# Patient Record
Sex: FEMALE | Race: ASIAN | NOT HISPANIC OR LATINO
[De-identification: names, ages, dates, MRNs, and addresses within clinical notes are randomized per-mention and may not be internally consistent; named-entity substitution may affect disease eponyms.]

---

## 2017-06-12 ENCOUNTER — APPOINTMENT (OUTPATIENT)
Dept: ENDOCRINOLOGY | Facility: CLINIC | Age: 37
End: 2017-06-12

## 2017-06-12 VITALS
HEIGHT: 63 IN | BODY MASS INDEX: 21.26 KG/M2 | HEART RATE: 78 BPM | OXYGEN SATURATION: 96 % | WEIGHT: 120 LBS | DIASTOLIC BLOOD PRESSURE: 58 MMHG | SYSTOLIC BLOOD PRESSURE: 92 MMHG

## 2018-04-16 ENCOUNTER — APPOINTMENT (OUTPATIENT)
Dept: ENDOCRINOLOGY | Facility: CLINIC | Age: 38
End: 2018-04-16

## 2018-05-04 ENCOUNTER — RESULT REVIEW (OUTPATIENT)
Age: 38
End: 2018-05-04

## 2019-08-19 ENCOUNTER — OTHER (OUTPATIENT)
Age: 39
End: 2019-08-19

## 2019-08-19 ENCOUNTER — APPOINTMENT (OUTPATIENT)
Dept: ENDOCRINOLOGY | Facility: CLINIC | Age: 39
End: 2019-08-19
Payer: COMMERCIAL

## 2019-08-19 VITALS — OXYGEN SATURATION: 96 % | HEART RATE: 55 BPM | DIASTOLIC BLOOD PRESSURE: 60 MMHG | SYSTOLIC BLOOD PRESSURE: 92 MMHG

## 2019-08-19 PROCEDURE — 99214 OFFICE O/P EST MOD 30 MIN: CPT

## 2019-08-19 NOTE — HISTORY OF PRESENT ILLNESS
[FreeTextEntry1] : 38 yo female referred initially for secondary adrenal insufficiency evaluation 8/6/2015. 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, initially complicated by neurocardiogenic shock (EF 10% per cardiology, similar to Takotsubo's cardiomyopathy) requiring balloon pump and pressors, was stabilized at [facility] for 1-2 weeks (had D&C for loss of pregnancy), EF improved, transferred to [place] for neuro care, had embolization x 2 and craniotomy with resection of large AVM and  shunt, was in neuro ICU for a month with trach and peg, transferred to [facility] for rehab for 5 weeks, then had seizure, found that glue they used during neurosurgery for embolization was causing inflammation of brain so was admitted back to [place] in early November for repeat neurosurgery to clean out glue, was back in neuro ICU for another month, then went back to [place] rehab for another 6 weeks, then d/c'd to home from [place] 12/2014. Since her ICH Decadron had been started for cerebral edema. On doses as high as 10 mg q6. Her  had been slowly titrating her steroid doses down. Seen initially by me 8/2015. Recommended slow taper over the course of 1-2 months of prednisone, which she has been off now since 2015. No symptoms or signs of adrenal insufficiency. Repeat am cortisol off steroids appropriate 8/2015. \par \par She was also found to have osteoporosis with DXA 8/3/15 revealing a T-score of -3.5 in the spine, -2.5 in the total hip[, and -2.3 in the femoral neck. She has not had any known fractures. She is mostly immobile, but trying to do some exercises with legs and some attempts at walking.  admits to her being a high fall risk. He also reports she has had amenorrhea. Recommended Alendronate weekly which she taking in the liquid form started 3/2016. She has had some bloating and gas but denies any gastritis or reflux symptoms. No interval falls or fractures. Had took extracted 1-2 months prior to starting Fosamax. Has been more mobile since last visit now walking with a walker at home. No recent falls or fractures.

## 2019-08-19 NOTE — RESULTS/DATA
[Unknown] : unknown [L1 - L4] : L1 - L4 [T-Score ___] : T-score: [unfilled] [FreeTextEntry2] : 8/3/15 [FreeTextEntry1] : 6/12/17: Spine -2.9, Hip -3.5, Neck -3.6, Radius 1/3 0.4

## 2019-08-19 NOTE — REVIEW OF SYSTEMS
[Fatigue] : fatigue [Gas/Bloating] : gas/bloating [Cold Intolerance] : cold intolerant [Difficulty Walking] : difficulty walking [All other systems negative] : All other systems negative [Recent Weight Loss (___ Lbs)] : recent [unfilled] ~Ulb weight loss [Chest Pain] : no chest pain [Shortness Of Breath] : no shortness of breath [Nausea] : no nausea [Vomiting] : no vomiting was observed [FreeTextEntry8] : amenorrhea

## 2019-08-19 NOTE — PHYSICAL EXAM
[Alert] : alert [No Acute Distress] : no acute distress [Normal Sclera/Conjunctiva] : normal sclera/conjunctiva [No Proptosis] : no proptosis [Normal Oropharynx] : the oropharynx was normal [Supple] : the neck was supple [Thyroid Not Enlarged] : the thyroid was not enlarged [No Respiratory Distress] : no respiratory distress [Normal Rate and Effort] : normal respiratory rhythm and effort [No Accessory Muscle Use] : no accessory muscle use [Normal Rate] : heart rate was normal  [Clear to Auscultation] : lungs were clear to auscultation bilaterally [Normal S1, S2] : normal S1 and S2 [Regular Rhythm] : with a regular rhythm [Normal Bowel Sounds] : normal bowel sounds [No Edema] : there was no peripheral edema [Not Tender] : non-tender [Soft] : abdomen soft [No Stigmata of Cushings Syndrome] : no stigmata of cushings syndrome [Not Distended] : not distended [Abdominal Striae] : no abdominal striae [Acanthosis Nigricans] : no acanthosis nigricans [Normal Affect] : the affect was normal [Normal Mood] : the mood was normal [de-identified] : +nystagmus [de-identified] : unable to assess gait

## 2019-08-19 NOTE — ASSESSMENT
[FreeTextEntry1] : 38 y/o F w/\par \par 1. Osteoporosis- Discussed treatment options at length with the . Pt. at high risk of fracture and warrants treatment. Continue with Fosamax. DXA performed 6/2017 with improvement in spine, but worse in the hip and femoral neck. Prior images unable to be viewed so difficulty fully assessing if there is a significant difference. Most likely cause is steroids possibly combined with amenorrhea. Estrogen may theoretically be a reasonable option for treatment especially if her estradiol is low, but given her immobility, I would avoid estrogen for risk of DVT. Can c/w Fosamax for now repeat DXA. If worsening DXA or fracture switch to Prolia. \par \par 2. Amenorrhea- recommend Provera and GYN follow-up. Consider OCP now more mobile. Discussed risk of endometrial cancer with amenorrhea.

## 2019-08-23 ENCOUNTER — APPOINTMENT (OUTPATIENT)
Dept: ENDOCRINOLOGY | Facility: CLINIC | Age: 39
End: 2019-08-23
Payer: COMMERCIAL

## 2019-08-23 VITALS — WEIGHT: 118 LBS | BODY MASS INDEX: 20.91 KG/M2 | HEIGHT: 63 IN

## 2019-08-23 PROCEDURE — 77085 DXA BONE DENSITY AXL VRT FX: CPT

## 2020-02-21 ENCOUNTER — APPOINTMENT (OUTPATIENT)
Dept: INTERNAL MEDICINE | Facility: CLINIC | Age: 40
End: 2020-02-21
Payer: COMMERCIAL

## 2020-02-21 VITALS
SYSTOLIC BLOOD PRESSURE: 86 MMHG | HEART RATE: 60 BPM | DIASTOLIC BLOOD PRESSURE: 62 MMHG | TEMPERATURE: 98.8 F | OXYGEN SATURATION: 97 % | HEIGHT: 63 IN

## 2020-02-21 DIAGNOSIS — H55.00 UNSPECIFIED NYSTAGMUS: ICD-10-CM

## 2020-02-21 DIAGNOSIS — G90.9 DISORDER OF THE AUTONOMIC NERVOUS SYSTEM, UNSPECIFIED: ICD-10-CM

## 2020-02-21 DIAGNOSIS — I31.3 PERICARDIAL EFFUSION (NONINFLAMMATORY): ICD-10-CM

## 2020-02-21 DIAGNOSIS — I95.9 HYPOTENSION, UNSPECIFIED: ICD-10-CM

## 2020-02-21 DIAGNOSIS — R56.9 UNSPECIFIED CONVULSIONS: ICD-10-CM

## 2020-02-21 DIAGNOSIS — I95.1 ORTHOSTATIC HYPOTENSION: ICD-10-CM

## 2020-02-21 DIAGNOSIS — R41.3 OTHER AMNESIA: ICD-10-CM

## 2020-02-21 LAB
BILIRUB UR QL STRIP: NEGATIVE
CLARITY UR: CLEAR
COLLECTION METHOD: NORMAL
GLUCOSE UR-MCNC: NEGATIVE
HCG UR QL: 0.2 EU/DL
HGB UR QL STRIP.AUTO: NEGATIVE
KETONES UR-MCNC: NEGATIVE
LEUKOCYTE ESTERASE UR QL STRIP: NEGATIVE
NITRITE UR QL STRIP: NEGATIVE
PH UR STRIP: 7.5
PROT UR STRIP-MCNC: NEGATIVE
SP GR UR STRIP: 1.02

## 2020-02-21 PROCEDURE — 99205 OFFICE O/P NEW HI 60 MIN: CPT | Mod: 25

## 2020-02-21 PROCEDURE — 81003 URINALYSIS AUTO W/O SCOPE: CPT | Mod: QW

## 2020-02-21 RX ORDER — CHROMIUM 200 MCG
500 TABLET ORAL DAILY
Refills: 0 | Status: ACTIVE | COMMUNITY
Start: 2020-02-21

## 2020-02-21 RX ORDER — MEDROXYPROGESTERONE ACETATE 10 MG/1
10 TABLET ORAL
Qty: 1 | Refills: 5 | Status: DISCONTINUED | COMMUNITY
Start: 2017-06-12 | End: 2020-02-21

## 2020-02-24 NOTE — RESULTS/DATA
[] : results reviewed [de-identified] : normal [de-identified] : normal [de-identified] : r bbb

## 2020-02-24 NOTE — ASSESSMENT
[Patient Optimized for Surgery] : Patient optimized for surgery [No Further Testing Recommended] : no further testing recommended [As per surgery] : as per surgery [FreeTextEntry4] : Pt with complicated history, although All acute illness have resolved. patient is verbally limited, has residual left hemiparesis, and hemianopsia.

## 2020-02-24 NOTE — REVIEW OF SYSTEMS
[Fatigue] : fatigue [Constipation] : constipation [Heartburn] : heartburn [Headache] : headache [Dizziness] : dizziness [Memory Loss] : memory loss [Unsteady Walking] : ataxia [Fever] : no fever [Chills] : no chills [Night Sweats] : no night sweats [Chest Pain] : no chest pain [Palpitations] : no palpitations [Wheezing] : no wheezing [Shortness Of Breath] : no shortness of breath [Abdominal Pain] : no abdominal pain [Cough] : no cough [Nausea] : no nausea [Vomiting] : no vomiting [Incontinence] : no incontinence [Negative] : Heme/Lymph [FreeTextEntry3] : wears glasses   [FreeTextEntry7] : takes meds for constipation- will have BM daily [de-identified] : uses AD- therapeutic ambulation. symptoms chronic

## 2020-02-24 NOTE — HISTORY OF PRESENT ILLNESS
[Spouse] : spouse [(Patient denies any chest pain, claudication, dyspnea on exertion, orthopnea, palpitations or syncope)] : Patient denies any chest pain, claudication, dyspnea on exertion, orthopnea, palpitations or syncope [Aortic Stenosis] : no aortic stenosis [Atrial Fibrillation] : no atrial fibrillation [Coronary Artery Disease] : no coronary artery disease [Recent Myocardial Infarction] : no recent myocardial infarction [Implantable Device/Pacemaker] : no implantable device/pacemaker [COPD] : no COPD [Asthma] : no asthma [Sleep Apnea] : no sleep apnea [Smoker] : not a smoker [No Adverse Anesthesia Reaction] : no adverse anesthesia reaction in self or family member [Chronic Kidney Disease] : no chronic kidney disease [Chronic Anticoagulation] : no chronic anticoagulation [Diabetes] : no diabetes [FreeTextEntry1] : L 5th metatarsal fx repair  fx [FreeTextEntry2] : 2/26/2020 [FreeTextEntry3] : Dr. Barrett Lynch [FreeTextEntry4] : Ms. Pat Gregory presents with her  for medical clearance.  is the primary caretaker of this disabled woman, and he is an otolaryngologist. He is an excellent historian. Patient has limited communication.\par Ms. gregory has a complicated medical history whereas in August 2014 she suffered a ruptured right posterior parietal AVM,  causing intraventricular hemorrhage resulting in left hemiplegia, hemianopsia, this dysautonomia, Takotsubo cardiomyopathy requiring IABP and a fetal demise at 3 months\par Patient was subsequently transferred to [place], developed a pericardial effusion, had a pericardiocentesis and was transferred to [place] rehab by September. 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 [FreeTextEntry7] : echocardiogram 2015: EF 50% trace effusions, trace MR\par EKG: RBBB

## 2020-02-24 NOTE — PHYSICAL EXAM
[Alert and Oriented x3] : oriented to person, place, and time [Normal Sclera/Conjunctiva] : normal sclera/conjunctiva [Normal Outer Ear/Nose] : the outer ears and nose were normal in appearance [Normal Oropharynx] : the oropharynx was normal [Normal Nasal Mucosa] : the nasal mucosa was normal [Normal TMs] : both tympanic membranes were normal [No JVD] : no jugular venous distention [No Lymphadenopathy] : no lymphadenopathy [Thyroid Normal, No Nodules] : the thyroid was normal and there were no nodules present [Supple] : supple [Clear to Auscultation] : lungs were clear to auscultation bilaterally [No Respiratory Distress] : no respiratory distress  [No Accessory Muscle Use] : no accessory muscle use [Normal S1, S2] : normal S1 and S2 [Regular Rhythm] : with a regular rhythm [Normal Rate] : normal rate  [No Abdominal Bruit] : a ~M bruit was not heard ~T in the abdomen [No Murmur] : no murmur heard [Soft] : abdomen soft [No Edema] : there was no peripheral edema [Non Tender] : non-tender [Non-distended] : non-distended [No Masses] : no abdominal mass palpated [No HSM] : no HSM [Normal Bowel Sounds] : normal bowel sounds [Normal] : no rash [Normal Insight/Judgement] : insight and judgment were intact [Normal Affect] : the affect was normal [de-identified] : thin, [de-identified] : sluggish pupil reaction b/l, Nystagmus b/l, disconjugate gaze [de-identified] : clonus t/o    high tone to left LE, limited strength t/o BUEs and BLEs [de-identified] : sensation decreased to b/l LEs / AD when ambulating  [de-identified] : disorientation occ to date and time

## 2021-07-19 ENCOUNTER — APPOINTMENT (OUTPATIENT)
Dept: ENDOCRINOLOGY | Facility: CLINIC | Age: 41
End: 2021-07-19
Payer: COMMERCIAL

## 2021-07-19 VITALS
WEIGHT: 120 LBS | TEMPERATURE: 98.4 F | OXYGEN SATURATION: 96 % | HEIGHT: 63 IN | DIASTOLIC BLOOD PRESSURE: 56 MMHG | BODY MASS INDEX: 21.26 KG/M2 | HEART RATE: 62 BPM | SYSTOLIC BLOOD PRESSURE: 86 MMHG

## 2021-07-19 PROCEDURE — 99072 ADDL SUPL MATRL&STAF TM PHE: CPT

## 2021-07-19 PROCEDURE — 99214 OFFICE O/P EST MOD 30 MIN: CPT

## 2021-07-19 RX ORDER — ALENDRONATE SODIUM 70 MG/1
70 TABLET ORAL
Qty: 4 | Refills: 0 | Status: DISCONTINUED | COMMUNITY
Start: 2021-05-14

## 2021-07-19 NOTE — PHYSICAL EXAM
[Alert] : alert [No Acute Distress] : no acute distress [No Respiratory Distress] : no respiratory distress [No Accessory Muscle Use] : no accessory muscle use [Normal Rate and Effort] : normal respiratory rate and effort [Clear to Auscultation] : lungs were clear to auscultation bilaterally [Normal S1, S2] : normal S1 and S2 [Normal Rate] : heart rate was normal [Regular Rhythm] : with a regular rhythm [No Edema] : no peripheral edema [Normal Bowel Sounds] : normal bowel sounds [Not Tender] : non-tender [Not Distended] : not distended [Soft] : abdomen soft [Normal Affect] : the affect was normal [Normal Mood] : the mood was normal [de-identified] : +healed trach site

## 2021-07-19 NOTE — DATA REVIEWED
[FreeTextEntry1] : Labs 7/2019:\par PTH 45\par TSH 0.71\par CMP normal\par \par \par Labs 5/2017:\par Ca 9.5\par Glucose 89\par Creatinine 0.68\par Sodium 144\par \par Labs 3/2016:\par PTH 39\par CMP normal\par CBC normal\par \par Labs 8/2015:\par CMP normal\par TSH 2.54\par Free T4 1.2\par LH 7.6\par FSH 8.7\par Prolactin 7.7\par Estradiol 37\par Cortisol 21\par IGF-1 210\par \par Labs 5/2015:\par LDL 93\par A1c 4.4%\par Vit D 43\par TSH 0.93\par CMP normal except AST of 58

## 2021-07-19 NOTE — REVIEW OF SYSTEMS
[Fatigue] : no fatigue [Recent Weight Gain (___ Lbs)] : no recent weight gain [Recent Weight Loss (___ Lbs)] : no recent weight loss [Dysphagia] : no dysphagia [Dysphonia] : no dysphonia [Chest Pain] : no chest pain [Palpitations] : no palpitations [Nausea] : no nausea [Vomiting] : no vomiting [All other systems negative] : All other systems negative

## 2021-07-19 NOTE — HISTORY OF PRESENT ILLNESS
[FreeTextEntry1] : 42 yo female referred initially for secondary adrenal insufficiency evaluation 8/6/2015. 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, initially complicated by neurocardiogenic shock (EF 10% per cardiology, similar to Takotsubo's cardiomyopathy) requiring balloon pump and pressors, was stabilized at [facility] for 1-2 weeks (had D&C for loss of pregnancy), EF improved, transferred to [place] for neuro care, had embolization x 2 and craniotomy with resection of large AVM and  shunt, was in neuro ICU for a month with trach and peg, transferred to [facility] for rehab for 5 weeks, then had seizure, found that glue they used during neurosurgery for embolization was causing inflammation of brain so was admitted back to [place] in early November for repeat neurosurgery to clean out glue, was back in neuro ICU for another month, then went back to [place] rehab for another 6 weeks, then d/c'd to home from [place] 12/2014. Since her ICH Decadron had been started for cerebral edema. On doses as high as 10 mg q6. Her  had been slowly titrating her steroid doses down. Seen initially by me 8/2015. Recommended slow taper over the course of 1-2 months of prednisone, which she has been off now since 2015. No symptoms or signs of adrenal insufficiency. Repeat am cortisol off steroids appropriate 8/2015. \par \par She was also found to have osteoporosis with DXA 8/3/15 revealing a T-score of -3.5 in the spine, -2.5 in the total hip[, and -2.3 in the femoral neck. She has not had any known fractures. She was mostly immobile, but trying to do some exercises with legs and some attempts at walking.  admits to her being a high fall risk. He also reports she has had amenorrhea. Recommended Alendronate weekly which she started taking in the liquid form started 3/2016. She has had some bloating and gas but denies any gastritis or reflux symptoms. No interval falls or fractures. Had tooth extracted 1-2 months prior to starting Fosamax. Has been more mobile since last visit now walking with a walker at home. No recent falls or fractures.

## 2021-07-19 NOTE — ASSESSMENT
[FreeTextEntry1] : 40 y/o F w/\par \par 1. Osteoporosis- Discussed treatment options at length with the . Pt. at high risk of fracture and warrants treatment. Continue with Fosamax. DXA performed 6/2017 with improvement in spine, but worse in the hip and femoral neck. Prior images unable to be viewed so difficulty fully assessing if there is a significant difference. Most likely cause is steroids possibly combined with amenorrhea. Estrogen may theoretically be a reasonable option for treatment especially if her estradiol is low. Discussed at length with her  who is a physician. 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 We discussed the benefits of a low dose estrogen/progesterone OCP to help maintain any improvement in bone disease we made with the past 5 years of Fosamax. Repeat DXA now. If worsening DXA or fracture switch to Prolia. Otherwise would recommend drug holiday with estrogen replacement. Will have her  discuss OCP use with her GYN and neurosurgeon as well. \par \par 2. Amenorrhea- recommend OCP. \par \par

## 2021-08-13 ENCOUNTER — APPOINTMENT (OUTPATIENT)
Dept: ENDOCRINOLOGY | Facility: CLINIC | Age: 41
End: 2021-08-13
Payer: COMMERCIAL

## 2021-08-13 VITALS — TEMPERATURE: 97.6 F | BODY MASS INDEX: 20.38 KG/M2 | HEIGHT: 63 IN | WEIGHT: 115 LBS

## 2021-08-13 PROCEDURE — 77080 DXA BONE DENSITY AXIAL: CPT

## 2021-09-17 ENCOUNTER — APPOINTMENT (OUTPATIENT)
Dept: OBGYN | Facility: CLINIC | Age: 41
End: 2021-09-17
Payer: COMMERCIAL

## 2021-09-17 VITALS
HEIGHT: 63 IN | SYSTOLIC BLOOD PRESSURE: 90 MMHG | WEIGHT: 115 LBS | DIASTOLIC BLOOD PRESSURE: 56 MMHG | BODY MASS INDEX: 20.38 KG/M2

## 2021-09-17 PROCEDURE — 99396 PREV VISIT EST AGE 40-64: CPT

## 2021-09-17 PROCEDURE — 82270 OCCULT BLOOD FECES: CPT

## 2021-09-19 LAB — HPV HIGH+LOW RISK DNA PNL CVX: NOT DETECTED

## 2021-09-22 LAB — CYTOLOGY CVX/VAG DOC THIN PREP: NORMAL

## 2021-10-04 ENCOUNTER — RX RENEWAL (OUTPATIENT)
Age: 41
End: 2021-10-04

## 2021-10-09 ENCOUNTER — APPOINTMENT (OUTPATIENT)
Dept: MAMMOGRAPHY | Facility: CLINIC | Age: 41
End: 2021-10-09
Payer: COMMERCIAL

## 2021-10-09 ENCOUNTER — APPOINTMENT (OUTPATIENT)
Dept: ULTRASOUND IMAGING | Facility: CLINIC | Age: 41
End: 2021-10-09
Payer: COMMERCIAL

## 2021-10-09 ENCOUNTER — RESULT REVIEW (OUTPATIENT)
Age: 41
End: 2021-10-09

## 2021-10-09 ENCOUNTER — OUTPATIENT (OUTPATIENT)
Dept: OUTPATIENT SERVICES | Facility: HOSPITAL | Age: 41
LOS: 1 days | End: 2021-10-09
Payer: COMMERCIAL

## 2021-10-09 DIAGNOSIS — M81.0 AGE-RELATED OSTEOPOROSIS WITHOUT CURRENT PATHOLOGICAL FRACTURE: ICD-10-CM

## 2021-10-09 PROCEDURE — 77063 BREAST TOMOSYNTHESIS BI: CPT | Mod: 26

## 2021-10-09 PROCEDURE — 77067 SCR MAMMO BI INCL CAD: CPT

## 2021-10-09 PROCEDURE — 76641 ULTRASOUND BREAST COMPLETE: CPT | Mod: 26,50

## 2021-10-09 PROCEDURE — 76641 ULTRASOUND BREAST COMPLETE: CPT

## 2021-10-09 PROCEDURE — 77063 BREAST TOMOSYNTHESIS BI: CPT

## 2021-10-09 PROCEDURE — 77067 SCR MAMMO BI INCL CAD: CPT | Mod: 26

## 2021-10-10 ENCOUNTER — RESULT REVIEW (OUTPATIENT)
Age: 41
End: 2021-10-10

## 2021-10-10 DIAGNOSIS — R92.1 MAMMOGRAPHIC CALCIFICATION FOUND ON DIAGNOSTIC IMAGING OF BREAST: ICD-10-CM

## 2021-10-22 ENCOUNTER — RESULT REVIEW (OUTPATIENT)
Age: 41
End: 2021-10-22

## 2021-10-22 ENCOUNTER — APPOINTMENT (OUTPATIENT)
Dept: MAMMOGRAPHY | Facility: CLINIC | Age: 41
End: 2021-10-22
Payer: COMMERCIAL

## 2021-10-22 ENCOUNTER — OUTPATIENT (OUTPATIENT)
Dept: OUTPATIENT SERVICES | Facility: HOSPITAL | Age: 41
LOS: 1 days | End: 2021-10-22
Payer: COMMERCIAL

## 2021-10-22 DIAGNOSIS — Z00.8 ENCOUNTER FOR OTHER GENERAL EXAMINATION: ICD-10-CM

## 2021-10-22 PROCEDURE — 77065 DX MAMMO INCL CAD UNI: CPT

## 2021-10-22 PROCEDURE — 77065 DX MAMMO INCL CAD UNI: CPT | Mod: 26,LT

## 2021-11-05 ENCOUNTER — ASOB RESULT (OUTPATIENT)
Age: 41
End: 2021-11-05

## 2021-11-05 ENCOUNTER — APPOINTMENT (OUTPATIENT)
Dept: OBGYN | Facility: CLINIC | Age: 41
End: 2021-11-05
Payer: COMMERCIAL

## 2021-11-05 PROCEDURE — 76830 TRANSVAGINAL US NON-OB: CPT

## 2021-11-09 ENCOUNTER — NON-APPOINTMENT (OUTPATIENT)
Age: 41
End: 2021-11-09

## 2022-01-07 ENCOUNTER — RX RENEWAL (OUTPATIENT)
Age: 42
End: 2022-01-07

## 2022-01-07 ENCOUNTER — APPOINTMENT (OUTPATIENT)
Dept: OBGYN | Facility: CLINIC | Age: 42
End: 2022-01-07

## 2022-02-18 ENCOUNTER — ASOB RESULT (OUTPATIENT)
Age: 42
End: 2022-02-18

## 2022-02-18 ENCOUNTER — APPOINTMENT (OUTPATIENT)
Dept: OBGYN | Facility: CLINIC | Age: 42
End: 2022-02-18
Payer: COMMERCIAL

## 2022-02-18 PROCEDURE — 76830 TRANSVAGINAL US NON-OB: CPT

## 2022-02-22 ENCOUNTER — NON-APPOINTMENT (OUTPATIENT)
Age: 42
End: 2022-02-22

## 2022-05-10 ENCOUNTER — RX RENEWAL (OUTPATIENT)
Age: 42
End: 2022-05-10

## 2022-07-15 ENCOUNTER — APPOINTMENT (OUTPATIENT)
Dept: ENDOCRINOLOGY | Facility: CLINIC | Age: 42
End: 2022-07-15

## 2022-07-15 VITALS — BODY MASS INDEX: 21.26 KG/M2 | HEIGHT: 63 IN | WEIGHT: 120 LBS | TEMPERATURE: 98.2 F

## 2022-07-15 PROCEDURE — 77085 DXA BONE DENSITY AXL VRT FX: CPT

## 2022-08-01 ENCOUNTER — APPOINTMENT (OUTPATIENT)
Dept: ENDOCRINOLOGY | Facility: CLINIC | Age: 42
End: 2022-08-01

## 2022-08-02 ENCOUNTER — APPOINTMENT (OUTPATIENT)
Dept: OBGYN | Facility: CLINIC | Age: 42
End: 2022-08-02

## 2022-08-02 ENCOUNTER — ASOB RESULT (OUTPATIENT)
Age: 42
End: 2022-08-02

## 2022-08-02 VITALS
WEIGHT: 120 LBS | HEIGHT: 63 IN | BODY MASS INDEX: 21.26 KG/M2 | DIASTOLIC BLOOD PRESSURE: 66 MMHG | SYSTOLIC BLOOD PRESSURE: 96 MMHG

## 2022-08-02 DIAGNOSIS — R18.8 OTHER ASCITES: ICD-10-CM

## 2022-08-02 PROCEDURE — 82270 OCCULT BLOOD FECES: CPT

## 2022-08-02 PROCEDURE — 99396 PREV VISIT EST AGE 40-64: CPT

## 2022-08-02 PROCEDURE — 76830 TRANSVAGINAL US NON-OB: CPT

## 2022-08-03 ENCOUNTER — APPOINTMENT (OUTPATIENT)
Dept: ENDOCRINOLOGY | Facility: CLINIC | Age: 42
End: 2022-08-03

## 2022-08-03 VITALS
HEART RATE: 57 BPM | HEIGHT: 63 IN | OXYGEN SATURATION: 96 % | DIASTOLIC BLOOD PRESSURE: 60 MMHG | TEMPERATURE: 98.3 F | SYSTOLIC BLOOD PRESSURE: 87 MMHG

## 2022-08-03 LAB
BASOPHILS # BLD AUTO: 0.04 K/UL
BASOPHILS NFR BLD AUTO: 0.5 %
EOSINOPHIL # BLD AUTO: 0.02 K/UL
EOSINOPHIL NFR BLD AUTO: 0.2 %
HCT VFR BLD CALC: 46.8 %
HGB BLD-MCNC: 15.2 G/DL
HPV HIGH+LOW RISK DNA PNL CVX: NOT DETECTED
IMM GRANULOCYTES NFR BLD AUTO: 0.4 %
LYMPHOCYTES # BLD AUTO: 0.96 K/UL
LYMPHOCYTES NFR BLD AUTO: 12 %
MAN DIFF?: NORMAL
MCHC RBC-ENTMCNC: 31 PG
MCHC RBC-ENTMCNC: 32.5 GM/DL
MCV RBC AUTO: 95.5 FL
MONOCYTES # BLD AUTO: 0.35 K/UL
MONOCYTES NFR BLD AUTO: 4.4 %
NEUTROPHILS # BLD AUTO: 6.62 K/UL
NEUTROPHILS NFR BLD AUTO: 82.5 %
PLATELET # BLD AUTO: 239 K/UL
RBC # BLD: 4.9 M/UL
RBC # FLD: 12 %
WBC # FLD AUTO: 8.02 K/UL

## 2022-08-03 PROCEDURE — 99214 OFFICE O/P EST MOD 30 MIN: CPT

## 2022-08-03 NOTE — PHYSICAL EXAM
[Alert] : alert [No Acute Distress] : no acute distress [No Respiratory Distress] : no respiratory distress [No Accessory Muscle Use] : no accessory muscle use [Normal Rate and Effort] : normal respiratory rate and effort [Clear to Auscultation] : lungs were clear to auscultation bilaterally [Normal S1, S2] : normal S1 and S2 [Normal Rate] : heart rate was normal [Regular Rhythm] : with a regular rhythm [No Edema] : no peripheral edema [Normal Bowel Sounds] : normal bowel sounds [Not Tender] : non-tender [Not Distended] : not distended [Soft] : abdomen soft [Normal Affect] : the affect was normal [Normal Mood] : the mood was normal [de-identified] : +healed trach site [de-identified] : b/l weakness in wheelchair

## 2022-08-03 NOTE — REVIEW OF SYSTEMS
[All other systems negative] : All other systems negative [Fatigue] : no fatigue [Recent Weight Gain (___ Lbs)] : no recent weight gain [Recent Weight Loss (___ Lbs)] : no recent weight loss [Dysphagia] : no dysphagia [Dysphonia] : no dysphonia [Chest Pain] : no chest pain [Palpitations] : no palpitations [Nausea] : no nausea [Vomiting] : no vomiting

## 2022-08-03 NOTE — HISTORY OF PRESENT ILLNESS
[FreeTextEntry1] : 41 yo female referred initially for secondary adrenal insufficiency evaluation 8/6/2015. 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, initially complicated by neurocardiogenic shock (EF 10% per cardiology, similar to Takotsubo's cardiomyopathy) requiring balloon pump and pressors, was stabilized at [facility] for 1-2 weeks (had D&C for loss of pregnancy), EF improved, transferred to [place] for neuro care, had embolization x 2 and craniotomy with resection of large AVM and  shunt, was in neuro ICU for a month with trach and peg, transferred to [facility] for rehab for 5 weeks, then had seizure, found that glue they used during neurosurgery for embolization was causing inflammation of brain so was admitted back to [place] in early November for repeat neurosurgery to clean out glue, was back in neuro ICU for another month, then went back to [place] rehab for another 6 weeks, then d/c'd to home from [place] 12/2014. Since her ICH Decadron had been started for cerebral edema. On doses as high as 10 mg q6. Her  had been slowly titrating her steroid doses down. Seen initially by me 8/2015. Recommended slow taper over the course of 1-2 months of prednisone, which she has been off now since 2015. No symptoms or signs of adrenal insufficiency. Repeat am cortisol off steroids appropriate 8/2015. \par \par She was also found to have osteoporosis with DXA 8/3/15 revealing a T-score of -3.5 in the spine, -2.5 in the total hip[, and -2.3 in the femoral neck. She has not had any known fractures. She was mostly immobile, but trying to do some exercises with legs and some attempts at walking.  admits to her being a high fall risk. He also reports she has had amenorrhea. Recommended Alendronate weekly which she started taking in the liquid form started 3/2016. She had some bloating and gas but denies any gastritis or reflux symptoms. Discussed at length with her  who is a physician. 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 We discussed the benefits of a low dose estrogen/progesterone OCP to help maintain any improvement in bone disease we made with the past 5 years of Fosamax. Repeat DEXA  7/2022 (1 year after starting OCP) showed stability in the spine and improvement in the hip. Had tooth extracted 1-2 months prior to starting Fosamax. Has been more mobile since last visit now walking with a walker at home. No recent falls or fractures.

## 2022-08-03 NOTE — ASSESSMENT
[FreeTextEntry1] : 41 y/o F w/\par \par 1. Osteoporosis- Discussed treatment options at length with the . Pt. at high risk of fracture and warrants treatment. s/p Fosamax. DXA performed 6/2017 with improvement in spine, but worse in the hip and femoral neck. Prior images unable to be viewed so difficulty fully assessing if there is a significant difference. Most likely cause is steroids possibly combined with amenorrhea. Estrogen may theoretically be a reasonable option for treatment especially since her estradiol was low. Discussed at length with her  who is a physician. 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 We discussed the benefits of a low dose estrogen/progesterone OCP to help maintain any improvement in bone disease we made with 5 years of Fosamax. Repeat DXA now stable in the spine and improved in the hip on 1 year of OCP. OCP being managed by GYN Dr. Janna Ponce. If worsening DXA or fracture switch to Anabolic agent like Forteo or Tymlos. \par \par 2. Amenorrhea- recommended OCP. Now with some breakthrough bleeding at least once every 3 months. GYN follow-up. \par \par Prep time with review of labs and interval progress notes and consultations \par Discussion with patient regarding osteoporosis and low estrogen state with management plan, risks and benefits of treatment options and goals of care answering all patients questions and addressing all concerns \par Review of recent DEXA results with patient and  at length\par Post-visit completion charting and review\par Total Time 30 min\par \par Specifically causes, evaluation, treatment options, risks, complications, and benefits of available therapies were discussed. Questions were answered.\par \par The submitted E/M billing level for this visit reflects the total time spent on the day of the visit including face-to-face time spent with the patient, non-face-to-face review of medical records and relevant information, documentation, and asynchronous communication with the patient after a visit via phone, email, or patient’s EHR portal after the visit. \par The medical records reviewed are either scanned into the chart or reviewed with the patient using a patient’s electronic medical records portal for patients with records not available to [facility] via electronic transmission platforms from other institutions and labs. \par Time spend counseling and performing coordination of care was also included in determining the appropriate EM billing level.\par \par I have reviewed and verified information regarding the chief complaint and history recorded by the ancillary staff and/or the patient. I have independently reviewed and interpreted tests performed by other physicians and facilities as necessary. \par \par I have discussed with the patient differential diagnosis, reason for auxiliary tests if ordered, risks, benefits, alternatives, and complications of each form of therapy were discussed. \par \par \par \par

## 2022-08-04 ENCOUNTER — TRANSCRIPTION ENCOUNTER (OUTPATIENT)
Age: 42
End: 2022-08-04

## 2022-08-04 LAB
24R-OH-CALCIDIOL SERPL-MCNC: 95.6 PG/ML
25(OH)D3 SERPL-MCNC: 52.6 NG/ML
ALBUMIN SERPL ELPH-MCNC: 4.4 G/DL
ALP BLD-CCNC: 72 U/L
ALT SERPL-CCNC: 12 U/L
ANION GAP SERPL CALC-SCNC: 11 MMOL/L
AST SERPL-CCNC: 12 U/L
BILIRUB SERPL-MCNC: 0.4 MG/DL
BUN SERPL-MCNC: 14 MG/DL
CALCIUM SERPL-MCNC: 9.3 MG/DL
CALCIUM SERPL-MCNC: 9.3 MG/DL
CHLORIDE SERPL-SCNC: 102 MMOL/L
CHOLEST SERPL-MCNC: 190 MG/DL
CO2 SERPL-SCNC: 26 MMOL/L
CREAT SERPL-MCNC: 0.87 MG/DL
EGFR: 85 ML/MIN/1.73M2
ESTIMATED AVERAGE GLUCOSE: 103 MG/DL
GLUCOSE SERPL-MCNC: 78 MG/DL
HBA1C MFR BLD HPLC: 5.2 %
HDLC SERPL-MCNC: 56 MG/DL
LDLC SERPL CALC-MCNC: 102 MG/DL
NONHDLC SERPL-MCNC: 135 MG/DL
PARATHYROID HORMONE INTACT: 29 PG/ML
PHOSPHATE SERPL-MCNC: 3.1 MG/DL
POTASSIUM SERPL-SCNC: 5 MMOL/L
PROT SERPL-MCNC: 6.4 G/DL
SODIUM SERPL-SCNC: 139 MMOL/L
T4 FREE SERPL-MCNC: 1.4 NG/DL
TRIGL SERPL-MCNC: 165 MG/DL
TSH SERPL-ACNC: 0.9 UIU/ML

## 2022-08-06 LAB — CYTOLOGY CVX/VAG DOC THIN PREP: NORMAL

## 2022-11-11 ENCOUNTER — RESULT REVIEW (OUTPATIENT)
Age: 42
End: 2022-11-11

## 2022-11-11 ENCOUNTER — APPOINTMENT (OUTPATIENT)
Dept: MAMMOGRAPHY | Facility: CLINIC | Age: 42
End: 2022-11-11

## 2022-11-11 ENCOUNTER — APPOINTMENT (OUTPATIENT)
Dept: ULTRASOUND IMAGING | Facility: CLINIC | Age: 42
End: 2022-11-11

## 2022-11-11 ENCOUNTER — OUTPATIENT (OUTPATIENT)
Dept: OUTPATIENT SERVICES | Facility: HOSPITAL | Age: 42
LOS: 1 days | End: 2022-11-11
Payer: COMMERCIAL

## 2022-11-11 DIAGNOSIS — Z00.8 ENCOUNTER FOR OTHER GENERAL EXAMINATION: ICD-10-CM

## 2022-11-11 DIAGNOSIS — Z01.419 ENCOUNTER FOR GYNECOLOGICAL EXAMINATION (GENERAL) (ROUTINE) WITHOUT ABNORMAL FINDINGS: ICD-10-CM

## 2022-11-11 PROCEDURE — 77063 BREAST TOMOSYNTHESIS BI: CPT

## 2022-11-11 PROCEDURE — 76641 ULTRASOUND BREAST COMPLETE: CPT | Mod: 26,50

## 2022-11-11 PROCEDURE — 77067 SCR MAMMO BI INCL CAD: CPT | Mod: 26

## 2022-11-11 PROCEDURE — 77063 BREAST TOMOSYNTHESIS BI: CPT | Mod: 26

## 2022-11-11 PROCEDURE — 76641 ULTRASOUND BREAST COMPLETE: CPT

## 2022-11-11 PROCEDURE — 77067 SCR MAMMO BI INCL CAD: CPT

## 2023-06-30 ENCOUNTER — APPOINTMENT (OUTPATIENT)
Dept: INTERNAL MEDICINE | Facility: CLINIC | Age: 43
End: 2023-06-30
Payer: COMMERCIAL

## 2023-06-30 VITALS
HEIGHT: 63 IN | BODY MASS INDEX: 21.26 KG/M2 | WEIGHT: 120 LBS | DIASTOLIC BLOOD PRESSURE: 60 MMHG | OXYGEN SATURATION: 98 % | SYSTOLIC BLOOD PRESSURE: 80 MMHG | HEART RATE: 65 BPM

## 2023-06-30 DIAGNOSIS — Z23 ENCOUNTER FOR IMMUNIZATION: ICD-10-CM

## 2023-06-30 DIAGNOSIS — Z01.419 ENCOUNTER FOR GYNECOLOGICAL EXAMINATION (GENERAL) (ROUTINE) W/OUT ABNORMAL FINDINGS: ICD-10-CM

## 2023-06-30 DIAGNOSIS — K80.20 CALCULUS OF GALLBLADDER W/OUT CHOLECYSTITIS W/OUT OBSTRUCTION: ICD-10-CM

## 2023-06-30 DIAGNOSIS — Z82.62 FAMILY HISTORY OF OSTEOPOROSIS: ICD-10-CM

## 2023-06-30 DIAGNOSIS — I62.9 NONTRAUMATIC INTRACRANIAL HEMORRHAGE, UNSPECIFIED: ICD-10-CM

## 2023-06-30 DIAGNOSIS — Z87.898 PERSONAL HISTORY OF OTHER SPECIFIED CONDITIONS: ICD-10-CM

## 2023-06-30 DIAGNOSIS — Z00.00 ENCOUNTER FOR GENERAL ADULT MEDICAL EXAMINATION W/OUT ABNORMAL FINDINGS: ICD-10-CM

## 2023-06-30 PROCEDURE — 99396 PREV VISIT EST AGE 40-64: CPT | Mod: 25

## 2023-06-30 PROCEDURE — 90471 IMMUNIZATION ADMIN: CPT

## 2023-06-30 PROCEDURE — 90715 TDAP VACCINE 7 YRS/> IM: CPT

## 2023-06-30 RX ORDER — SUCRALFATE 1 G/10ML
1 SUSPENSION ORAL
Qty: 420 | Refills: 0 | Status: DISCONTINUED | COMMUNITY
Start: 2022-02-11 | End: 2023-06-30

## 2023-07-01 PROBLEM — K80.20 GALLSTONES: Status: ACTIVE | Noted: 2023-07-01

## 2023-07-01 PROBLEM — Z01.419 ENCOUNTER FOR ANNUAL ROUTINE GYNECOLOGICAL EXAMINATION: Status: RESOLVED | Noted: 2021-09-16 | Resolved: 2023-07-01

## 2023-07-01 RX ORDER — NORETHINDRONE ACETATE AND ETHINYL ESTRADIOL 1; 20 MG/1; UG/1
TABLET ORAL
Refills: 0 | Status: DISCONTINUED | COMMUNITY
End: 2023-07-01

## 2023-07-01 RX ORDER — CA/D3/MAG OX/ZINC/COP/MANG/BOR 600 MG-800
250 MCG TABLET,CHEWABLE ORAL WEEKLY
Refills: 0 | Status: ACTIVE | COMMUNITY

## 2023-07-01 RX ORDER — MULTIVIT-MIN/IRON/FOLIC ACID/K 18-600-40
50 MCG CAPSULE ORAL
Refills: 0 | Status: DISCONTINUED | COMMUNITY
Start: 2020-02-21 | End: 2023-07-01

## 2023-07-01 RX ORDER — LIDOCAINE HYDROCHLORIDE 20 MG/ML
2 SOLUTION ORAL; TOPICAL
Qty: 400 | Refills: 0 | Status: DISCONTINUED | COMMUNITY
Start: 2022-02-11 | End: 2023-07-01

## 2023-07-01 RX ORDER — OMEGA-3/DHA/EPA/FISH OIL 300-1000MG
CAPSULE ORAL AT BEDTIME
Refills: 0 | Status: ACTIVE | COMMUNITY

## 2023-07-01 RX ORDER — CHROMIUM 200 MCG
TABLET ORAL
Refills: 0 | Status: DISCONTINUED | COMMUNITY
End: 2023-07-01

## 2023-07-02 NOTE — REVIEW OF SYSTEMS
[Anxiety] : anxiety [Negative] : Heme/Lymph [FreeTextEntry2] : see hpi [FreeTextEntry3] : will have slight disconjugate gaze and left ?hemineglect vs. partial field deficit [FreeTextEntry4] : see hpi, has stoma from trach site [FreeTextEntry7] : see hpi [FreeTextEntry8] : see hpi [FreeTextEntry9] : see hpi - has some left hemiparesis (in particular left leg) with increased muscle tone with severe effort [de-identified] : see hpi [de-identified] : mood is good, very positive

## 2023-07-02 NOTE — PHYSICAL EXAM
[No Acute Distress] : no acute distress [Well Nourished] : well nourished [Well Developed] : well developed [Well-Appearing] : well-appearing [Normal TMs] : both tympanic membranes were normal [Normal Oropharynx] : the oropharynx was normal [No Lymphadenopathy] : no lymphadenopathy [Supple] : supple [Thyroid Normal, No Nodules] : the thyroid was normal and there were no nodules present [No Respiratory Distress] : no respiratory distress  [No Accessory Muscle Use] : no accessory muscle use [Clear to Auscultation] : lungs were clear to auscultation bilaterally [Normal Rate] : normal rate  [Regular Rhythm] : with a regular rhythm [No Murmur] : no murmur heard [Normal S1, S2] : normal S1 and S2 [No Carotid Bruits] : no carotid bruits [Pedal Pulses Present] : the pedal pulses are present [No Edema] : there was no peripheral edema [Soft] : abdomen soft [Non Tender] : non-tender [Non-distended] : non-distended [No Masses] : no abdominal mass palpated [No HSM] : no HSM [Normal Bowel Sounds] : normal bowel sounds [Normal Supraclavicular Nodes] : no supraclavicular lymphadenopathy [Normal Posterior Cervical Nodes] : no posterior cervical lymphadenopathy [Normal Anterior Cervical Nodes] : no anterior cervical lymphadenopathy [No Joint Swelling] : no joint swelling [No Rash] : no rash [Normal Affect] : the affect was normal [de-identified] : examined in wheelchair [de-identified] : unable to hold eyes in certain position with extraocular movements (but able to move eyes around), pupils slowly reactive to light [de-identified] :  shunt in place left neck [de-identified] : stoma from trach site covered with duoderm [de-identified] : examined sitting up [de-identified] : wears braces on her legs, 4+/5 strength UE b/l and RLE, extension of muscles LLE (reflex or ?increased muscle tone) with strength testing LLE [de-identified] : sometimes has superior or altered gaze, sometimes neck flexed to left [de-identified] : answers some questions

## 2023-07-02 NOTE — HEALTH RISK ASSESSMENT
[Patient reported mammogram was normal] : Patient reported mammogram was normal [Patient reported bone density results were abnormal] : Patient reported bone density results were abnormal [Patient reported PAP Smear was normal] : Patient reported PAP Smear was normal [MammogramDate] : 11/22 [PapSmearDate] : 08/22 [BoneDensityComments] : osteoporosis [BoneDensityDate] : 07/22 [ColonoscopyComments] : not yet

## 2023-07-02 NOTE — ASSESSMENT
[FreeTextEntry1] : 44 yo female with h/o as above including intracranial (intraventricular) hemorrhage at age 34 from ruptured AVM complicated by neurocardiogenic shock initially that resolved, autonomic dysfunction, hypotension, seizures (which subsequently resolved), hemiparesis, premature menopause, osteoporosis, here for CPE and to re-establish care.\par 1.  CV - stable hypotension and asymptomatic, able to tolerate exercise without difficulty with PT, check lipids\par 2.  Neuro -  reports no further seizures and was not told needed further monitoring by neuro,  will continue to monitor \par 3.  Gyn - pap and mammo utd\par 4.  Endo - following with endo for osteoporosis and menopause, on ocp for bone protection, dexa utd, check vitamin D\par 5.  GI - due for colonoscopy next year, suggested consult with CRS (referred to Dr. English) to see if other options for eval/treatment of anorectal dyssynergy\par 6.  Optho - referred to neuro-optho Dr. Edgar Patrick to see if would benefit from eye exercises\par 7.  HCM - would check below labs (declines  hiv and hep C test as low risk); tdap today, not due for other vaccines\par 8.  RTO prn or 1 year\par \par

## 2023-07-02 NOTE — HISTORY OF PRESENT ILLNESS
[de-identified] : 42 yo female with h/o as below here to re-establish care and for CPE.\par Had steroid-dependent osteoporosis, was on Alendronate and on Junel for hormone replacement as now postmenopausal.  Seeing endocrine Dr. Way.\par Following with gyn Dr. Janna Motta.\par Slowly progressively improving.  Singing again.\par Had foot fracture left foot and had surgery, had some tendon treatment.\par Walking better, with walker at home.\par Still getting a lot of home therapy, 3 times/week PT, 3 times/week OT, neuropsych, mood has been positive, working on memory and orientation.  Mostly oriented to month and day but not date.  Still has vision abnl, has neglect on left side.  Will compensate by right eye.  \par Had botox on legs for muscle spasms.    \par Body heat irregularities from dysautonomia, bp always low.   \par Has some hemorrhoids, not constipated, has anorectal dyssynergy, will valsalva (push and anus doesn't open), will ultimately successfully evacuate the stool but sometimes  will need to manually disimpact her.    doesn't want her to get botox or surgery. On Colace, hydration, fiber, magnesium.\par Has voiding anxiety, urinating frequently but can hold it, if not anxious will hold it for a while, will have 100-200 cc urine at a time.\par No seizures.  Doesn't see neurologist.  Has been off anti-epileptics for years.  \par Has some teeth issues, has great dentist.\par Every once in a while she aspirates and rarely will almost choke, still has stoma in from trach site so  will take off stoma patch and will be able to breathe.   not ready to have her stoma closed yet.  \par Peg out for many years.\par Has gallstones, but no symptoms. [FreeTextEntry1] : Initial history visit 5/22/15:  35 yo female with no prior PMHx here now as a new patient to establish care with PCP (never had one before), after extensive complicated hospital course over past year.  Brought in by  (her main caregiver), who is ENT at [facility] in [place].\par 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, initially complicated by neurocardiogenic shock (EF 10% per cardiology, similar to Takotsubo's cardiomyopathy) requiring balloon pump and pressors, was stabilized at [facility] for 1-2 weeks (had D&C for loss of pregnancy), EF improved, transferred to [place] for neuro care, had embolization x 2 and craniotomy with resection of large AVM (per  was size of an egg, told was congenital) and  shunt, was in neuro ICU for a month with trach and peg, transferred to [facility] for rehab for 5 weeks, then had seizure, found that glue they used during neurosurgery for embolization was causing inflammation of brain so was admitted back to [place] in early November for repeat neurosurgery to clean out glue, was back in neuro ICU for another month, then went back to [place] rehab for another 6 weeks, then d/c'd to home from [place] just before [place].  Has been doing well at home, progress is slow but she is improving greatly.  In December she was hemiparetic, now right arm regained 95% function, left arm 60% function, left leg decreased function.  Able to speak in English and Hebrew.  Trach was decannulated, stoma still open.  Family is taking care of her completely, her parents moved in with them and taking care of her during day, doing exercises with her, speech with her, memory with her,  takes care of her at night.  Exercises are slow due to dizziness, has some nystagmus (which becomes more rapid with stress), conjugate gaze a little off, mild autonomic dysfunction, mild orthostatic hypotension, will drop pressures at times and feel dizzy.  Able to control bladder but bladder size is small, can go 5 hours in between urinating, not using catheterization.  Was on tube feeds but tapered down, able to eat but  still uses peg to give medications at night.  Regaining some long term memory (ie. from honeymoon) but short-term memory isn't great.   reports mood is good (especially since she doesn't remember a lot).   monitors heart rate, bp, sugars, all vitals okay, at one point was tachycardic but  increased fluids and heart rate came back down, now getting 1 liter of water a day.  Hasn't regained menses since episode, is amenorrheic, no chance of pregnancy.  Neurosurgeon Dr. HARINDER Lozano at [place], was told everything was doing well except some "transependymal resorption correlating with CSF pressures," last saw 1 month ago.  Doesn't see neurologist, doesn't have PT (as doing it himself per ),  notes he prefers less specialists (only ones really needed) as he feels he is able to do a lot on his own (and with help of physician friends) and very difficult to get pt to appointments.  Was on very high doses of steroids through this process (at one point, decadron 10 mg q6), now down to Prednisone 5 mg day (which he was told by endocrinologist was a physiologic dose so didn't even need Pneumovax as wasn't considered immunocompromised), at some point may come off steroids completely (on now for presumed adrenal insufficiency), wondering if needs endocrine eval to help with taper to off and also not sure if needs DEXA.  No other active issues.\par \par \par

## 2023-07-28 ENCOUNTER — APPOINTMENT (OUTPATIENT)
Dept: ENDOCRINOLOGY | Facility: CLINIC | Age: 43
End: 2023-07-28
Payer: COMMERCIAL

## 2023-07-28 VITALS — WEIGHT: 120 LBS | HEIGHT: 63 IN | BODY MASS INDEX: 21.26 KG/M2

## 2023-07-28 PROCEDURE — 77085 DXA BONE DENSITY AXL VRT FX: CPT

## 2023-08-02 ENCOUNTER — APPOINTMENT (OUTPATIENT)
Dept: ENDOCRINOLOGY | Facility: CLINIC | Age: 43
End: 2023-08-02
Payer: COMMERCIAL

## 2023-08-02 VITALS
WEIGHT: 120 LBS | DIASTOLIC BLOOD PRESSURE: 65 MMHG | TEMPERATURE: 98 F | HEART RATE: 70 BPM | HEIGHT: 63 IN | OXYGEN SATURATION: 99 % | BODY MASS INDEX: 21.26 KG/M2 | SYSTOLIC BLOOD PRESSURE: 95 MMHG

## 2023-08-02 PROCEDURE — 99214 OFFICE O/P EST MOD 30 MIN: CPT

## 2023-08-02 NOTE — REASON FOR VISIT
[Follow - Up] : a follow-up visit [Osteoporosis] : osteoporosis [Hypogonadism] : hypogonadism [Spouse] : spouse

## 2023-08-02 NOTE — DATA REVIEWED
[FreeTextEntry1] : Labs 8/2022: CBC normal A1c 5.2%  Chol 190 HDL 56  CMP normal Phos 3.1 Ca 9.3 PTH 29 TSH 0.90 Free T4 1.4 Vit D 52.6  Labs 7/2019: PTH 45 TSH 0.71 CMP normal   Labs 5/2017: Ca 9.5 Glucose 89 Creatinine 0.68 Sodium 144  Labs 3/2016: PTH 39 CMP normal CBC normal  Labs 8/2015: CMP normal TSH 2.54 Free T4 1.2 LH 7.6 FSH 8.7 Prolactin 7.7 Estradiol 37 Cortisol 21 IGF-1 210  Labs 5/2015: LDL 93 A1c 4.4% Vit D 43 TSH 0.93 CMP normal except AST of 58

## 2023-08-02 NOTE — HISTORY OF PRESENT ILLNESS
[FreeTextEntry1] : 44 yo female referred initially for secondary adrenal insufficiency evaluation 8/6/2015. 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, initially complicated by neurocardiogenic shock (EF 10% per cardiology, similar to Takotsubo's cardiomyopathy) requiring balloon pump and pressors, was stabilized at [facility] for 1-2 weeks (had D&C for loss of pregnancy), EF improved, transferred to [place] for neuro care, had embolization x 2 and craniotomy with resection of large AVM and  shunt, was in neuro ICU for a month with trach and peg, transferred to [facility] for rehab for 5 weeks, then had seizure, found that glue they used during neurosurgery for embolization was causing inflammation of brain so was admitted back to [place] in early November for repeat neurosurgery to clean out glue, was back in neuro ICU for another month, then went back to [facility] rehab for another 6 weeks, then d/c'd to home from [facility] 12/2014. Since her ICH Decadron had been started for cerebral edema. On doses as high as 10 mg q6. Her  had been slowly titrating her steroid doses down. Seen initially by me 8/2015. Recommended slow taper over the course of 1-2 months of prednisone, which she has been off now since 2015. No symptoms or signs of adrenal insufficiency. Repeat am cortisol off steroids appropriate 8/2015.   She was also found to have osteoporosis with DXA 8/3/15 revealing a T-score of -3.5 in the spine, -2.5 in the total hip[, and -2.3 in the femoral neck. She has not had any known fractures. She was mostly immobile, but trying to do some exercises with legs and some attempts at walking.  admits to her being a high fall risk. He also reports she has had amenorrhea. Recommended Alendronate weekly which she started taking in the liquid form started 3/2016. She had some bloating and gas but denies any gastritis or reflux symptoms. Discussed at length with her  who is a physician. 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 We discussed the benefits of a low dose estrogen/progesterone OCP to help maintain any improvement in bone disease we made with the past 5 years of Fosamax. Repeat DEXA  7/2022 (1 year after starting OCP) showed stability in the spine and improvement in the hip. Had tooth extracted 1-2 months prior to starting Fosamax. Has been more mobile since last visit now walking with a walker at home. Has PT 3 days per week. No recent falls or fractures. No back or hip pain. On OCP patient has had breakthrough bleeding about two times per year.  Repeat DEXA 7/28/2023 shows: VS DEXA 7/15/23 L1-4 T score -3.4, with statistically significant worsening of BMD and consistent with osteoporosis Femoral Neck -2.8, c/w osteoporosis with statistically worsening BMD Total Hip -2.8 c/w osteoporosis with statistically significant worsening in BMD  Discussed possibility of anabolic therapy with PTH analogs abaloperatide and teriparatide and also romozusumab. No personal history of radiation exposure, breast or bone cancer. Patient feels she can do a once daily injection and would prefer that over a once per month injection in the office

## 2023-08-02 NOTE — ASSESSMENT
[FreeTextEntry1] : 44 y/o F w/ a PMH of hypogonadotropic hypogonadism, osteoporosis, complex AVM rupture and intracranial hemorrhage resulting in being wheelchair-bound who presents for follow up of osteoporosis. Recent DEXA scan 7/28/23 shows statistically significant worsening of BMD in hip and spine  1. Osteoporosis most- Discussed treatment options at length with the . Pt. at high risk of fracture and warrants treatment. s/p Fosamax. DXA performed 6/2017 with improvement in spine, but worse in the hip and femoral neck. Prior images unable to be viewed so difficulty fully assessing if there is a significant difference. Most likely cause is steroids possibly combined with amenorrhea. Estrogen may theoretically be a reasonable option for treatment especially since her estradiol was low. Discussed at length with her  who is a physician. 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 We discussed the benefits of a low dose estrogen/progesterone OCP to help maintain any improvement in bone disease we made with 5 years of Fosamax. Repeat DXA now stable in the spine and improved in the hip on 1 year of OCP. OCP being managed by GYN Dr. Janna Ponce.  -will work on obtaining Tymlos, and if not able to obtain can try obtaining Forteo or consider Romozusumab  2. Amenorrhea- recommended OCP. Now with some breakthrough bleeding at least once every 6 months. GYN follow-up.   Case discussed with Dr. Rayna Bustamante MD Endocrinology Fellow  Prep time with review of labs and interval progress notes and consultations  Discussion with patient regarding osteoporosis and low estrogen state with management plan, risks and benefits of treatment options and goals of care answering all patients questions and addressing all concerns  Review of recent DEXA results with patient and  at length Post-visit completion charting and review Total Time 30 min  Specifically causes, evaluation, treatment options, risks, complications, and benefits of available therapies were discussed. Questions were answered.  The submitted E/M billing level for this visit reflects the total time spent on the day of the visit including face-to-face time spent with the patient, non-face-to-face review of medical records and relevant information, documentation, and asynchronous communication with the patient after a visit via phone, email, or patient's EHR portal after the visit.  The medical records reviewed are either scanned into the chart or reviewed with the patient using a patient's electronic medical records portal for patients with records not available to [facility] via electronic transmission platforms from other institutions and labs.  Time spend counseling and performing coordination of care was also included in determining the appropriate EM billing level.  I have reviewed and verified information regarding the chief complaint and history recorded by the ancillary staff and/or the patient. I have independently reviewed and interpreted tests performed by other physicians and facilities as necessary.   I have discussed with the patient differential diagnosis, reason for auxiliary tests if ordered, risks, benefits, alternatives, and complications of each form of therapy were discussed.

## 2023-08-02 NOTE — REVIEW OF SYSTEMS
[All other systems negative] : All other systems negative [Fatigue] : no fatigue [Recent Weight Gain (___ Lbs)] : no recent weight gain [Recent Weight Loss (___ Lbs)] : no recent weight loss [Dysphagia] : no dysphagia [Dysphonia] : no dysphonia [Chest Pain] : no chest pain [Palpitations] : no palpitations [Nausea] : no nausea [Vomiting] : no vomiting [Muscle Weakness] : muscle weakness [Acanthosis] : no acanthosis  [Cold Intolerance] : no cold intolerance [Heat Intolerance] : no heat intolerance

## 2023-08-04 RX ORDER — PEN NEEDLE, DIABETIC 31 GX3/16"
31G X 5 MM NEEDLE, DISPOSABLE MISCELLANEOUS
Qty: 1 | Refills: 3 | Status: ACTIVE | COMMUNITY
Start: 2023-08-04

## 2023-09-19 ENCOUNTER — TRANSCRIPTION ENCOUNTER (OUTPATIENT)
Age: 43
End: 2023-09-19

## 2023-09-27 ENCOUNTER — TRANSCRIPTION ENCOUNTER (OUTPATIENT)
Age: 43
End: 2023-09-27

## 2023-12-08 ENCOUNTER — APPOINTMENT (OUTPATIENT)
Dept: OBGYN | Facility: CLINIC | Age: 43
End: 2023-12-08

## 2024-01-22 ENCOUNTER — TRANSCRIPTION ENCOUNTER (OUTPATIENT)
Age: 44
End: 2024-01-22

## 2024-01-22 DIAGNOSIS — Z12.31 ENCOUNTER FOR SCREENING MAMMOGRAM FOR MALIGNANT NEOPLASM OF BREAST: ICD-10-CM

## 2024-01-22 DIAGNOSIS — R92.30 DENSE BREASTS, UNSPECIFIED: ICD-10-CM

## 2024-03-01 ENCOUNTER — APPOINTMENT (OUTPATIENT)
Dept: MAMMOGRAPHY | Facility: CLINIC | Age: 44
End: 2024-03-01
Payer: COMMERCIAL

## 2024-03-01 ENCOUNTER — RESULT REVIEW (OUTPATIENT)
Age: 44
End: 2024-03-01

## 2024-03-01 ENCOUNTER — OUTPATIENT (OUTPATIENT)
Dept: OUTPATIENT SERVICES | Facility: HOSPITAL | Age: 44
LOS: 1 days | End: 2024-03-01
Payer: COMMERCIAL

## 2024-03-01 ENCOUNTER — APPOINTMENT (OUTPATIENT)
Dept: ULTRASOUND IMAGING | Facility: CLINIC | Age: 44
End: 2024-03-01
Payer: COMMERCIAL

## 2024-03-01 DIAGNOSIS — Z12.31 ENCOUNTER FOR SCREENING MAMMOGRAM FOR MALIGNANT NEOPLASM OF BREAST: ICD-10-CM

## 2024-03-01 PROCEDURE — 76641 ULTRASOUND BREAST COMPLETE: CPT | Mod: 26,50

## 2024-03-01 PROCEDURE — 77063 BREAST TOMOSYNTHESIS BI: CPT | Mod: 26

## 2024-03-01 PROCEDURE — 77067 SCR MAMMO BI INCL CAD: CPT | Mod: 26

## 2024-03-01 PROCEDURE — 76641 ULTRASOUND BREAST COMPLETE: CPT

## 2024-03-01 PROCEDURE — 77063 BREAST TOMOSYNTHESIS BI: CPT

## 2024-03-01 PROCEDURE — 77067 SCR MAMMO BI INCL CAD: CPT

## 2024-03-11 ENCOUNTER — APPOINTMENT (OUTPATIENT)
Dept: ENDOCRINOLOGY | Facility: CLINIC | Age: 44
End: 2024-03-11
Payer: COMMERCIAL

## 2024-03-11 VITALS
OXYGEN SATURATION: 97 % | WEIGHT: 120 LBS | DIASTOLIC BLOOD PRESSURE: 71 MMHG | SYSTOLIC BLOOD PRESSURE: 104 MMHG | BODY MASS INDEX: 21.26 KG/M2 | HEART RATE: 68 BPM | HEIGHT: 63 IN

## 2024-03-11 DIAGNOSIS — N91.2 AMENORRHEA, UNSPECIFIED: ICD-10-CM

## 2024-03-11 DIAGNOSIS — M81.0 AGE-RELATED OSTEOPOROSIS W/OUT CURRENT PATHOLOGICAL FRACTURE: ICD-10-CM

## 2024-03-11 PROCEDURE — 99214 OFFICE O/P EST MOD 30 MIN: CPT

## 2024-03-11 PROCEDURE — G2211 COMPLEX E/M VISIT ADD ON: CPT | Mod: NC,1L

## 2024-03-11 NOTE — PHYSICAL EXAM
[Alert] : alert [No Acute Distress] : no acute distress [No Respiratory Distress] : no respiratory distress [No Accessory Muscle Use] : no accessory muscle use [Normal Rate] : heart rate was normal [Normal Rate and Effort] : normal respiratory rate and effort [Regular Rhythm] : with a regular rhythm [No Edema] : no peripheral edema [Not Tender] : non-tender [Not Distended] : not distended [Soft] : abdomen soft [No Spinal Tenderness] : no spinal tenderness [Spine Straight] : spine straight [Normal Affect] : the affect was normal [Oriented x3] : oriented to person, place, and time [Normal Insight/Judgement] : insight and judgment were intact [Normal Mood] : the mood was normal [EOMI] : extra ocular movement intact [Normal Hearing] : hearing was normal [Supple] : the neck was supple [Kyphosis] : no kyphosis present [Scoliosis] : no scoliosis [No Stigmata of Cushings Syndrome] : no stigmata of Cushings Syndrome [de-identified] : +healed trach site [de-identified] : b/l weakness in wheelchair

## 2024-03-11 NOTE — REASON FOR VISIT
[Follow - Up] : a follow-up visit [Adrenal Evaluation/Adrenal Disorder] : adrenal evaluation/adrenal disorder [Osteoporosis] : osteoporosis [Spouse] : spouse

## 2024-03-11 NOTE — ASSESSMENT
[FreeTextEntry1] : 42 y/o F w/ a PMH of hypogonadotropic hypogonadism, osteoporosis, complex AVM rupture and intracranial hemorrhage resulting in being wheelchair-bound who presents for follow up of osteoporosis. Recent DEXA scan 7/28/23 shows statistically significant worsening of BMD in hip and spine  1. Osteoporosis most- Discussed treatment options at length with the . Pt. at high risk of fracture and warrants treatment. s/p Fosamax. DXA performed 6/2017 with improvement in spine, but worse in the hip and femoral neck. Prior images unable to be viewed so difficulty fully assessing if there is a significant difference. Most likely cause is steroids possibly combined with amenorrhea. Estrogen may theoretically be a reasonable option for treatment especially since her estradiol was low. Discussed at length with her  who is a physician. 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 We discussed the benefits of a low dose estrogen/progesterone OCP to help maintain any improvement in bone disease we made with 5 years of Fosamax. Repeat DXA 7/2023 with decrease in the spine hip on 1 year of OCP s/p Fosamax. OCP being managed by GYN Dr. Janna Ponce.  -Now on Tymlos sine 9/2023. Repeat DEXA 9/2024 s/p 1 year of Tymlos. Check bone turnover markers.   2. Amenorrhea- recommended OCP. Now with some breakthrough bleeding at least once every 6 months. GYN follow-up.    Prep time with review of labs and interval progress notes and consultations  Discussion with patient regarding osteoporosis and low estrogen state with management plan, risks and benefits of treatment options and goals of care answering all patients questions and addressing all concerns  Review of recent DEXA results with patient and  at length Post-visit completion charting and review Total Time 30 min  Specifically causes, evaluation, treatment options, risks, complications, and benefits of available therapies were discussed. Questions were answered.  The submitted E/M billing level for this visit reflects the total time spent on the day of the visit including face-to-face time spent with the patient, non-face-to-face review of medical records and relevant information, documentation, and asynchronous communication with the patient after a visit via phone, email, or patient's EHR portal after the visit.  The medical records reviewed are either scanned into the chart or reviewed with the patient using a patient's electronic medical records portal for patients with records not available to [facility] via electronic transmission platforms from other institutions and labs.  Time spend counseling and performing coordination of care was also included in determining the appropriate EM billing level.  I have reviewed and verified information regarding the chief complaint and history recorded by the ancillary staff and/or the patient. I have independently reviewed and interpreted tests performed by other physicians and facilities as necessary.   I have discussed with the patient differential diagnosis, reason for auxiliary tests if ordered, risks, benefits, alternatives, and complications of each form of therapy were discussed.

## 2024-03-11 NOTE — HISTORY OF PRESENT ILLNESS
[FreeTextEntry1] : 44 yo female referred initially for secondary adrenal insufficiency evaluation 8/6/2015. 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, initially complicated by neurocardiogenic shock (EF 10% per cardiology, similar to Takotsubo's cardiomyopathy) requiring balloon pump and pressors, was stabilized at [facility] for 1-2 weeks (had D&C for loss of pregnancy), EF improved, transferred to [place] for neuro care, had embolization x 2 and craniotomy with resection of large AVM and  shunt, was in neuro ICU for a month with trach and peg, transferred to [facility] for rehab for 5 weeks, then had seizure, found that glue they used during neurosurgery for embolization was causing inflammation of brain so was admitted back to [place] in early November for repeat neurosurgery to clean out glue, was back in neuro ICU for another month, then went back to [facility] rehab for another 6 weeks, then d/c'd to home from [place] 12/2014. Since her ICH Decadron had been started for cerebral edema. On doses as high as 10 mg q6. Her  had been slowly titrating her steroid doses down. Seen initially by me 8/2015. Recommended slow taper over the course of 1-2 months of prednisone, which she has been off now since 2015. No symptoms or signs of adrenal insufficiency. Repeat am cortisol off steroids appropriate 8/2015.   She was also found to have osteoporosis with DXA 8/3/15 revealing a T-score of -3.5 in the spine, -2.5 in the total hip[, and -2.3 in the femoral neck. She has not had any known fractures. She was mostly immobile, but trying to do some exercises with legs and some attempts at walking.  admits to her being a high fall risk. He also reports she has had amenorrhea. Recommended Alendronate weekly which she started taking in the liquid form started 3/2016. She had some bloating and gas but denies any gastritis or reflux symptoms. Discussed at length with her  who is a physician. 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 We discussed the benefits of a low dose estrogen/progesterone OCP to help maintain any improvement in bone disease we made with the past 5 years of Fosamax. Repeat DEXA  7/2022 (1 year after starting OCP) showed stability in the spine and improvement in the hip. Repeat DEXA 7/2023 with decline in spine and hip. Recommended anabolic agent. Now on Tymlos since 9/2023. Had tooth extracted 1-2 months prior to starting Fosamax. Has been more mobile since last visit now walking with a walker at home. Has PT 3 days per week. No recent falls or fractures. No back or hip pain. On OCP patient has had breakthrough bleeding about two times per year.  Repeat DEXA 7/28/2023 shows: VS DEXA 7/15/23 L1-4 T score -3.4, with statistically significant worsening of BMD and consistent with osteoporosis Femoral Neck -2.8, c/w osteoporosis with statistically worsening BMD Total Hip -2.8 c/w osteoporosis with statistically significant worsening in BMD  Now on Tymlos daily since 9/2023. Tolerating well. No interval fractures.

## 2024-03-11 NOTE — RESULTS/DATA
[Hologic] : hologic [Unknown] : unknown [L1 - L4] : L1 - L4 [T-Score ___] : T-score: [unfilled] [FreeTextEntry4] : 7/2023 [FreeTextEntry2] : 8/3/15 [FreeTextEntry1] : 6/12/17: Spine -2.9, Hip -3.5, Neck -3.6, Radius 1/3 0.4

## 2024-03-11 NOTE — REVIEW OF SYSTEMS
[Muscle Weakness] : muscle weakness [All other systems negative] : All other systems negative [Fatigue] : no fatigue [Recent Weight Gain (___ Lbs)] : no recent weight gain [Recent Weight Loss (___ Lbs)] : no recent weight loss [Dysphagia] : no dysphagia [Dysphonia] : no dysphonia [Chest Pain] : no chest pain [Palpitations] : no palpitations [Nausea] : no nausea [Vomiting] : no vomiting [Acanthosis] : no acanthosis  [Cold Intolerance] : no cold intolerance [Heat Intolerance] : no heat intolerance

## 2024-03-12 ENCOUNTER — APPOINTMENT (OUTPATIENT)
Dept: OBGYN | Facility: CLINIC | Age: 44
End: 2024-03-12
Payer: COMMERCIAL

## 2024-03-12 ENCOUNTER — ASOB RESULT (OUTPATIENT)
Age: 44
End: 2024-03-12

## 2024-03-12 VITALS
HEIGHT: 63 IN | SYSTOLIC BLOOD PRESSURE: 101 MMHG | BODY MASS INDEX: 22.15 KG/M2 | WEIGHT: 125 LBS | DIASTOLIC BLOOD PRESSURE: 63 MMHG

## 2024-03-12 DIAGNOSIS — Z01.419 ENCOUNTER FOR GYNECOLOGICAL EXAMINATION (GENERAL) (ROUTINE) W/OUT ABNORMAL FINDINGS: ICD-10-CM

## 2024-03-12 LAB
25(OH)D3 SERPL-MCNC: 50.9 NG/ML
ALBUMIN SERPL ELPH-MCNC: 4.2 G/DL
ALP BLD-CCNC: 94 U/L
ALT SERPL-CCNC: 14 U/L
ANION GAP SERPL CALC-SCNC: 10 MMOL/L
AST SERPL-CCNC: 11 U/L
BASOPHILS # BLD AUTO: 0.06 K/UL
BASOPHILS NFR BLD AUTO: 0.9 %
BILIRUB SERPL-MCNC: 0.2 MG/DL
BUN SERPL-MCNC: 12 MG/DL
CALCIUM SERPL-MCNC: 9.6 MG/DL
CALCIUM SERPL-MCNC: 9.6 MG/DL
CHLORIDE SERPL-SCNC: 106 MMOL/L
CHOLEST SERPL-MCNC: 205 MG/DL
CO2 SERPL-SCNC: 26 MMOL/L
CORTIS SERPL-MCNC: 13 UG/DL
CREAT SERPL-MCNC: 0.86 MG/DL
EGFR: 85 ML/MIN/1.73M2
EOSINOPHIL # BLD AUTO: 0.03 K/UL
EOSINOPHIL NFR BLD AUTO: 0.4 %
ESTIMATED AVERAGE GLUCOSE: 97 MG/DL
GLUCOSE SERPL-MCNC: 69 MG/DL
HBA1C MFR BLD HPLC: 5 %
HCT VFR BLD CALC: 44.4 %
HDLC SERPL-MCNC: 56 MG/DL
HGB BLD-MCNC: 14.4 G/DL
IMM GRANULOCYTES NFR BLD AUTO: 0.3 %
LDLC SERPL CALC-MCNC: 101 MG/DL
LYMPHOCYTES # BLD AUTO: 1.42 K/UL
LYMPHOCYTES NFR BLD AUTO: 21.2 %
MAN DIFF?: NORMAL
MCHC RBC-ENTMCNC: 31.8 PG
MCHC RBC-ENTMCNC: 32.4 GM/DL
MCV RBC AUTO: 98 FL
MONOCYTES # BLD AUTO: 0.41 K/UL
MONOCYTES NFR BLD AUTO: 6.1 %
NEUTROPHILS # BLD AUTO: 4.76 K/UL
NEUTROPHILS NFR BLD AUTO: 71.1 %
NONHDLC SERPL-MCNC: 148 MG/DL
PARATHYROID HORMONE INTACT: 18 PG/ML
PLATELET # BLD AUTO: 240 K/UL
POTASSIUM SERPL-SCNC: 4.1 MMOL/L
PROT SERPL-MCNC: 6.4 G/DL
RBC # BLD: 4.53 M/UL
RBC # FLD: 11.9 %
SODIUM SERPL-SCNC: 142 MMOL/L
T4 FREE SERPL-MCNC: 1.3 NG/DL
TRIGL SERPL-MCNC: 285 MG/DL
TSH SERPL-ACNC: 0.55 UIU/ML
WBC # FLD AUTO: 6.7 K/UL

## 2024-03-12 PROCEDURE — 76830 TRANSVAGINAL US NON-OB: CPT

## 2024-03-12 PROCEDURE — 99396 PREV VISIT EST AGE 40-64: CPT

## 2024-03-12 PROCEDURE — 82270 OCCULT BLOOD FECES: CPT

## 2024-03-12 RX ORDER — NORETHINDRONE ACETATE AND ETHINYL ESTRADIOL 1; 20 MG/1; UG/1
1-20 TABLET ORAL DAILY
Qty: 84 | Refills: 3 | Status: ACTIVE | COMMUNITY
Start: 2021-07-19 | End: 1900-01-01

## 2024-03-13 LAB — HPV HIGH+LOW RISK DNA PNL CVX: NOT DETECTED

## 2024-03-14 LAB
ALP BONE SERPL-MCNC: 22 UG/L
CYTOLOGY CVX/VAG DOC THIN PREP: NORMAL

## 2024-05-03 ENCOUNTER — RX RENEWAL (OUTPATIENT)
Age: 44
End: 2024-05-03

## 2024-05-03 RX ORDER — ABALOPARATIDE 2000 UG/ML
3120 INJECTION, SOLUTION SUBCUTANEOUS
Qty: 1 | Refills: 3 | Status: ACTIVE | COMMUNITY
Start: 2023-08-03 | End: 1900-01-01

## 2024-08-07 ENCOUNTER — APPOINTMENT (OUTPATIENT)
Dept: ENDOCRINOLOGY | Facility: CLINIC | Age: 44
End: 2024-08-07

## 2024-09-06 ENCOUNTER — APPOINTMENT (OUTPATIENT)
Dept: INTERNAL MEDICINE | Facility: CLINIC | Age: 44
End: 2024-09-06
Payer: COMMERCIAL

## 2024-09-06 ENCOUNTER — APPOINTMENT (OUTPATIENT)
Dept: ENDOCRINOLOGY | Facility: CLINIC | Age: 44
End: 2024-09-06
Payer: COMMERCIAL

## 2024-09-06 ENCOUNTER — OUTPATIENT (OUTPATIENT)
Dept: OUTPATIENT SERVICES | Facility: HOSPITAL | Age: 44
LOS: 1 days | End: 2024-09-06
Payer: COMMERCIAL

## 2024-09-06 VITALS — BODY MASS INDEX: 21.26 KG/M2 | WEIGHT: 120 LBS | HEIGHT: 63 IN

## 2024-09-06 VITALS — SYSTOLIC BLOOD PRESSURE: 86 MMHG | OXYGEN SATURATION: 97 % | DIASTOLIC BLOOD PRESSURE: 60 MMHG | HEART RATE: 74 BPM

## 2024-09-06 DIAGNOSIS — I62.9 NONTRAUMATIC INTRACRANIAL HEMORRHAGE, UNSPECIFIED: ICD-10-CM

## 2024-09-06 DIAGNOSIS — Z00.00 ENCOUNTER FOR GENERAL ADULT MEDICAL EXAMINATION W/OUT ABNORMAL FINDINGS: ICD-10-CM

## 2024-09-06 DIAGNOSIS — I10 ESSENTIAL (PRIMARY) HYPERTENSION: ICD-10-CM

## 2024-09-06 PROCEDURE — G0463: CPT

## 2024-09-06 PROCEDURE — 99396 PREV VISIT EST AGE 40-64: CPT

## 2024-09-06 PROCEDURE — 77085 DXA BONE DENSITY AXL VRT FX: CPT

## 2024-09-08 NOTE — REVIEW OF SYSTEMS
[Negative] : Psychiatric [FreeTextEntry3] : see hpi [FreeTextEntry7] : see hpi [FreeTextEntry9] : see hpi [de-identified] : see hpi

## 2024-09-08 NOTE — PAST MEDICAL HISTORY
[Amenorrhea] : amenorrhea [Total Preg ___] : G: [unfilled] [AB Spont ___] : miscarriage(s): [unfilled] [FreeTextEntry1] : no vaginal bleeding

## 2024-09-08 NOTE — PHYSICAL EXAM
[No Acute Distress] : no acute distress [Well Developed] : well developed [Well Nourished] : well nourished [Well-Appearing] : well-appearing [Normal Oropharynx] : the oropharynx was normal [Normal TMs] : both tympanic membranes were normal [No Lymphadenopathy] : no lymphadenopathy [Supple] : supple [Thyroid Normal, No Nodules] : the thyroid was normal and there were no nodules present [No Respiratory Distress] : no respiratory distress  [No Accessory Muscle Use] : no accessory muscle use [Normal Rate] : normal rate  [Clear to Auscultation] : lungs were clear to auscultation bilaterally [Regular Rhythm] : with a regular rhythm [Normal S1, S2] : normal S1 and S2 [No Murmur] : no murmur heard [No Carotid Bruits] : no carotid bruits [No Edema] : there was no peripheral edema [Soft] : abdomen soft [Non Tender] : non-tender [Non-distended] : non-distended [No Masses] : no abdominal mass palpated [No HSM] : no HSM [Normal Bowel Sounds] : normal bowel sounds [Normal Supraclavicular Nodes] : no supraclavicular lymphadenopathy [Normal Posterior Cervical Nodes] : no posterior cervical lymphadenopathy [Normal Anterior Cervical Nodes] : no anterior cervical lymphadenopathy [Normal Inguinal Nodes] : no inguinal lymphadenopathy [No Rash] : no rash [No Joint Swelling] : no joint swelling [de-identified] : examined in wheelchair  [de-identified] : pupils slowly reactive to light and do not react to light symmetrically, some deviation of gaze (chronic) but EOMI   [de-identified] : +stoma with patch anterior base of neck (from prior trach site) [de-identified] : sitting in wheelchair, braces on legs; 5/5 strength UE, some spasm LLE on strength testing LE b/l but approximately 4+ strength LE;  [de-identified] : tearful at times but mostly nl affect

## 2024-09-08 NOTE — REVIEW OF SYSTEMS
[Negative] : Heme/Lymph [FreeTextEntry3] : see hpi [FreeTextEntry7] : see hpi [FreeTextEntry9] : see hpi [de-identified] : see hpi

## 2024-09-08 NOTE — HISTORY OF PRESENT ILLNESS
[FreeTextEntry1] : Initial history visit 5/22/15:  35 yo female with no prior PMHx here now as a new patient to establish care with PCP (never had one before), after extensive complicated hospital course over past year.  Brought in by  (her main caregiver), who is ENT at [facility] in [place]. 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, initially complicated by neurocardiogenic shock (EF 10% per cardiology, similar to Takotsubo's cardiomyopathy) requiring balloon pump and pressors, was stabilized at [facility] for 1-2 weeks (had D&C for loss of pregnancy), EF improved, transferred to [place] for neuro care, had embolization x 2 and craniotomy with resection of large AVM (per  was size of an egg, told was congenital) and  shunt, was in neuro ICU for a month with trach and peg, transferred to [facility] for rehab for 5 weeks, then had seizure, found that glue they used during neurosurgery for embolization was causing inflammation of brain so was admitted back to [place] in early November for repeat neurosurgery to clean out glue, was back in neuro ICU for another month, then went back to [place] [facility]hab for another 6 weeks, then d/c'd to home from [place] just before [place].  Has been doing well at home, progress is slow but she is improving greatly.  In December she was hemiparetic, now right arm regained 95% function, left arm 60% function, left leg decreased function.  Able to speak in English and Turkish.  Trach was decannulated, stoma still open.  Family is taking care of her completely, her parents moved in with them and taking care of her during day, doing exercises with her, speech with her, memory with her,  takes care of her at night.  Exercises are slow due to dizziness, has some nystagmus (which becomes more rapid with stress), conjugate gaze a little off, mild autonomic dysfunction, mild orthostatic hypotension, will drop pressures at times and feel dizzy.  Able to control bladder but bladder size is small, can go 5 hours in between urinating, not using catheterization.  Was on tube feeds but tapered down, able to eat but  still uses peg to give medications at night.  Regaining some long term memory (ie. from honeymoon) but short-term memory isn't great.   reports mood is good (especially since she doesn't remember a lot).   monitors heart rate, bp, sugars, all vitals okay, at one point was tachycardic but  increased fluids and heart rate came back down, now getting 1 liter of water a day.  Hasn't regained menses since episode, is amenorrheic, no chance of pregnancy.  Neurosurgeon Dr. HARINDER Lozano at [facility], was told everything was doing well except some "transependymal resorption correlating with CSF pressures," last saw 1 month ago.  Doesn't see neurologist, doesn't have PT (as doing it himself per ),  notes he prefers less specialists (only ones really needed) as he feels he is able to do a lot on his own (and with help of physician friends) and very difficult to get pt to appointments.  Was on very high doses of steroids through this process (at one point, decadron 10 mg q6), now down to Prednisone 5 mg day (which he was told by endocrinologist was a physiologic dose so didn't even need Pneumovax as wasn't considered immunocompromised), at some point may come off steroids completely (on now for presumed adrenal insufficiency), wondering if needs endocrine eval to help with taper to off and also not sure if needs DEXA.  No other active issues.  9/6/24: Feeling well.  Still doesn't have voluntary control of anal sphincter so will valsalva, sometimes constipation.  Has a lot of fiber, psyllium, colace, magnesium, drinks fluids.  BMs 3-4 times/week.  Has small hemorrhoids.   Will go to dexa later today and see endocrine on Monday. Tymlos for past year, no side effects. Still getting PT and OT and neuropsych therapy and cognitive support and memory.  Neuropsych working on time orientation, improved significantly.  Walking few times/day.  Will use wheelchair as well.  Muscle tone significantly improved.  Will still need help with transfers (ie on toilet). Has persistent visual field issues, has visual field defects inferior visual field.  Doesn't have much diplopia now.   Neuro symptoms get worse when she gets sick with URI but then comes back to her baseline. Eats well and healthy, doesn't eat sweets, eats meat, fiber, fruits/vegetables. Still has stoma (scarred open) but did have episodes of choking once or twice/year so  doesn't want to close it for now. Has less awareness right side cheek/face.

## 2024-09-08 NOTE — HEALTH RISK ASSESSMENT
[Patient reported mammogram was normal] : Patient reported mammogram was normal [Patient reported PAP Smear was normal] : Patient reported PAP Smear was normal [Patient reported bone density results were abnormal] : Patient reported bone density results were abnormal [Never] : Never [MammogramDate] : 03/24 [BoneDensityDate] : 07/22 [PapSmearDate] : 03/24 [BoneDensityComments] : osteoporosis [ColonoscopyComments] : not yet

## 2024-09-08 NOTE — ASSESSMENT
[FreeTextEntry1] : 45 yo female with h/o as above including massive intracranial (intraventricular) hemorrhage age 34 from ruptured AVM complicated by neurocardiogenic shock that resolved, autonomic dysfunction, hypotension, seizures (resolved), premature menopause, osteoporosis, here for CPE. 1.  CV - chronic hypotension from autonomic dysfunction, lipids acceptable for age 2.  GI - will see gi Dr. Kim Reynolds for colon cancer screening as due; again suggested could consider consult with CRS (Dr. English) for possible anorectal dyssynergy but  wants to hold off for now 3.  Gyn - pap and mammo utd 4.  Endo - following with endo for osteoporosis and awaiting dexa 5.  HCM - hold on labs as had full labs earlier this year with endocrine; consider covid and flu shots, other vaccines utd 6.  RTO prn or 1 year

## 2024-09-08 NOTE — HISTORY OF PRESENT ILLNESS
[FreeTextEntry1] : Initial history visit 5/22/15:  35 yo female with no prior PMHx here now as a new patient to establish care with PCP (never had one before), after extensive complicated hospital course over past year.  Brought in by  (her main caregiver), who is ENT at [facility] [facility] [employer] in [place]. 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, initially complicated by neurocardiogenic shock (EF 10% per cardiology, similar to Takotsubo's cardiomyopathy) requiring balloon pump and pressors, was stabilized at [facility] for 1-2 weeks (had D&C for loss of pregnancy), EF improved, transferred to [place] for neuro care, had embolization x 2 and craniotomy with resection of large AVM (per  was size of an egg, told was congenital) and  shunt, was in neuro ICU for a month with trach and peg, transferred to [facility] for rehab for 5 weeks, then had seizure, found that glue they used during neurosurgery for embolization was causing inflammation of brain so was admitted back to [place] in early November for repeat neurosurgery to clean out glue, was back in neuro ICU for another month, then went back to [facility] for another 6 weeks, then d/c'd to home from [place] just before [place].  Has been doing well at home, progress is slow but she is improving greatly.  In December she was hemiparetic, now right arm regained 95% function, left arm 60% function, left leg decreased function.  Able to speak in English and Slovak.  Trach was decannulated, stoma still open.  Family is taking care of her completely, her parents moved in with them and taking care of her during day, doing exercises with her, speech with her, memory with her,  takes care of her at night.  Exercises are slow due to dizziness, has some nystagmus (which becomes more rapid with stress), conjugate gaze a little off, mild autonomic dysfunction, mild orthostatic hypotension, will drop pressures at times and feel dizzy.  Able to control bladder but bladder size is small, can go 5 hours in between urinating, not using catheterization.  Was on tube feeds but tapered down, able to eat but  still uses peg to give medications at night.  Regaining some long term memory (ie. from honeymoon) but short-term memory isn't great.   reports mood is good (especially since she doesn't remember a lot).   monitors heart rate, bp, sugars, all vitals okay, at one point was tachycardic but  increased fluids and heart rate came back down, now getting 1 liter of water a day.  Hasn't regained menses since episode, is amenorrheic, no chance of pregnancy.  Neurosurgeon Dr. HARINDER Lozano at [place], was told everything was doing well except some "transependymal resorption correlating with CSF pressures," last saw 1 month ago.  Doesn't see neurologist, doesn't have PT (as doing it himself per ),  notes he prefers less specialists (only ones really needed) as he feels he is able to do a lot on his own (and with help of physician friends) and very difficult to get pt to appointments.  Was on very high doses of steroids through this process (at one point, decadron 10 mg q6), now down to Prednisone 5 mg day (which he was told by endocrinologist was a physiologic dose so didn't even need Pneumovax as wasn't considered immunocompromised), at some point may come off steroids completely (on now for presumed adrenal insufficiency), wondering if needs endocrine eval to help with taper to off and also not sure if needs DEXA.  No other active issues.  9/6/24: Feeling well.  Still doesn't have voluntary control of anal sphincter so will valsalva, sometimes constipation.  Has a lot of fiber, psyllium, colace, magnesium, drinks fluids.  BMs 3-4 times/week.  Has small hemorrhoids.   Will go to dexa later today and see endocrine on Monday. Tymlos for past year, no side effects. Still getting PT and OT and neuropsych therapy and cognitive support and memory.  Neuropsych working on time orientation, improved significantly.  Walking few times/day.  Will use wheelchair as well.  Muscle tone significantly improved.  Will still need help with transfers (ie on toilet). Has persistent visual field issues, has visual field defects inferior visual field.  Doesn't have much diplopia now.   Neuro symptoms get worse when she gets sick with URI but then comes back to her baseline. Eats well and healthy, doesn't eat sweets, eats meat, fiber, fruits/vegetables. Still has stoma (scarred open) but did have episodes of choking once or twice/year so  doesn't want to close it for now. Has less awareness right side cheek/face.

## 2024-09-08 NOTE — ASSESSMENT
[FreeTextEntry1] : 43 yo female with h/o as above including massive intracranial (intraventricular) hemorrhage age 34 from ruptured AVM complicated by neurocardiogenic shock that resolved, autonomic dysfunction, hypotension, seizures (resolved), premature menopause, osteoporosis, here for CPE. 1.  CV - chronic hypotension from autonomic dysfunction, lipids acceptable for age 2.  GI - will see gi Dr. Kim Reynolds for colon cancer screening as due; again suggested could consider consult with CRS (Dr. English) for possible anorectal dyssynergy but  wants to hold off for now 3.  Gyn - pap and mammo utd 4.  Endo - following with endo for osteoporosis and awaiting dexa 5.  HCM - hold on labs as had full labs earlier this year with endocrine; consider covid and flu shots, other vaccines utd 6.  RTO prn or 1 year

## 2024-09-08 NOTE — PHYSICAL EXAM
[No Acute Distress] : no acute distress [Well Developed] : well developed [Well Nourished] : well nourished [Well-Appearing] : well-appearing [Normal Oropharynx] : the oropharynx was normal [Normal TMs] : both tympanic membranes were normal [No Lymphadenopathy] : no lymphadenopathy [Supple] : supple [Thyroid Normal, No Nodules] : the thyroid was normal and there were no nodules present [No Respiratory Distress] : no respiratory distress  [No Accessory Muscle Use] : no accessory muscle use [Normal Rate] : normal rate  [Clear to Auscultation] : lungs were clear to auscultation bilaterally [Regular Rhythm] : with a regular rhythm [Normal S1, S2] : normal S1 and S2 [No Murmur] : no murmur heard [No Carotid Bruits] : no carotid bruits [No Edema] : there was no peripheral edema [Soft] : abdomen soft [Non-distended] : non-distended [Non Tender] : non-tender [No Masses] : no abdominal mass palpated [No HSM] : no HSM [Normal Bowel Sounds] : normal bowel sounds [Normal Supraclavicular Nodes] : no supraclavicular lymphadenopathy [Normal Posterior Cervical Nodes] : no posterior cervical lymphadenopathy [Normal Anterior Cervical Nodes] : no anterior cervical lymphadenopathy [Normal Inguinal Nodes] : no inguinal lymphadenopathy [No Rash] : no rash [No Joint Swelling] : no joint swelling [de-identified] : examined in wheelchair  [de-identified] : pupils slowly reactive to light and do not react to light symmetrically, some deviation of gaze (chronic) but EOMI   [de-identified] : +stoma with patch anterior base of neck (from prior trach site) [de-identified] : sitting in wheelchair, braces on legs; 5/5 strength UE, some spasm LLE on strength testing LE b/l but approximately 4+ strength LE;  [de-identified] : tearful at times but mostly nl affect

## 2024-09-08 NOTE — HISTORY OF PRESENT ILLNESS
[FreeTextEntry1] : Initial history visit 5/22/15:  33 yo female with no prior PMHx here now as a new patient to establish care with PCP (never had one before), after extensive complicated hospital course over past year.  Brought in by  (her main caregiver), who is ENT at [facility] in [place]. 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, initially complicated by neurocardiogenic shock (EF 10% per cardiology, similar to Takotsubo's cardiomyopathy) requiring balloon pump and pressors, was stabilized at [facility] for 1-2 weeks (had D&C for loss of pregnancy), EF improved, transferred to [facility] for neuro care, had embolization x 2 and craniotomy with resection of large AVM (per  was size of an egg, 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  Has been doing well at home, progress is slow but she is improving greatly.  In December she was hemiparetic, now right arm regained 95% function, left arm 60% function, left leg decreased function.  Able to speak in English and Thai.  Trach was decannulated, stoma still open.  Family is taking care of her completely, her parents moved in with them and taking care of her during day, doing exercises with her, speech with her, memory with her,  takes care of her at night.  Exercises are slow due to dizziness, has some nystagmus (which becomes more rapid with stress), conjugate gaze a little off, mild autonomic dysfunction, mild orthostatic hypotension, will drop pressures at times and feel dizzy.  Able to control bladder but bladder size is small, can go 5 hours in between urinating, not using catheterization.  Was on tube feeds but tapered down, able to eat but  still uses peg to give medications at night.  Regaining some long term memory (ie. from honeymoon) but short-term memory isn't great.   reports mood is good (especially since she doesn't remember a lot).   monitors heart rate, bp, sugars, all vitals okay, at one point was tachycardic but  increased fluids and heart rate came back down, now getting 1 liter of water a day.  Hasn't regained menses since episode, is amenorrheic, no chance of pregnancy.  Neurosurgeon Dr. HARINDER Lozano at [facility], was told everything was doing well except some "transependymal resorption correlating with CSF pressures," last saw 1 month ago.  Doesn't see neurologist, doesn't have PT (as doing it himself per ),  notes he prefers less specialists (only ones really needed) as he feels he is able to do a lot on his own (and with help of physician friends) and very difficult to get pt to appointments.  Was on very high doses of steroids through this process (at one point, decadron 10 mg q6), now down to Prednisone 5 mg day (which he was told by endocrinologist was a physiologic dose so didn't even need Pneumovax as wasn't considered immunocompromised), at some point may come off steroids completely (on now for presumed adrenal insufficiency), wondering if needs endocrine eval to help with taper to off and also not sure if needs DEXA.  No other active issues.  9/6/24: Feeling well.  Still doesn't have voluntary control of anal sphincter so will valsalva, sometimes constipation.  Has a lot of fiber, psyllium, colace, magnesium, drinks fluids.  BMs 3-4 times/week.  Has small hemorrhoids.   Will go to dexa later today and see endocrine on Monday. Tymlos for past year, no side effects. Still getting PT and OT and neuropsych therapy and cognitive support and memory.  Neuropsych working on time orientation, improved significantly.  Walking few times/day.  Will use wheelchair as well.  Muscle tone significantly improved.  Will still need help with transfers (ie on toilet). Has persistent visual field issues, has visual field defects inferior visual field.  Doesn't have much diplopia now.   Neuro symptoms get worse when she gets sick with URI but then comes back to her baseline. Eats well and healthy, doesn't eat sweets, eats meat, fiber, fruits/vegetables. Still has stoma (scarred open) but did have episodes of choking once or twice/year so  doesn't want to close it for now. Has less awareness right side cheek/face.

## 2024-09-08 NOTE — REVIEW OF SYSTEMS
[Negative] : Psychiatric [FreeTextEntry3] : see hpi [FreeTextEntry7] : see hpi [FreeTextEntry9] : see hpi [de-identified] : see hpi

## 2024-09-08 NOTE — PHYSICAL EXAM
[No Acute Distress] : no acute distress [Well Nourished] : well nourished [Well Developed] : well developed [Well-Appearing] : well-appearing [Normal Oropharynx] : the oropharynx was normal [Normal TMs] : both tympanic membranes were normal [No Lymphadenopathy] : no lymphadenopathy [Supple] : supple [Thyroid Normal, No Nodules] : the thyroid was normal and there were no nodules present [No Respiratory Distress] : no respiratory distress  [No Accessory Muscle Use] : no accessory muscle use [Normal Rate] : normal rate  [Clear to Auscultation] : lungs were clear to auscultation bilaterally [Regular Rhythm] : with a regular rhythm [Normal S1, S2] : normal S1 and S2 [No Murmur] : no murmur heard [No Carotid Bruits] : no carotid bruits [No Edema] : there was no peripheral edema [Soft] : abdomen soft [Non-distended] : non-distended [Non Tender] : non-tender [No Masses] : no abdominal mass palpated [No HSM] : no HSM [Normal Bowel Sounds] : normal bowel sounds [Normal Supraclavicular Nodes] : no supraclavicular lymphadenopathy [Normal Posterior Cervical Nodes] : no posterior cervical lymphadenopathy [Normal Anterior Cervical Nodes] : no anterior cervical lymphadenopathy [Normal Inguinal Nodes] : no inguinal lymphadenopathy [No Joint Swelling] : no joint swelling [No Rash] : no rash [de-identified] : examined in wheelchair  [de-identified] : pupils slowly reactive to light and do not react to light symmetrically, some deviation of gaze (chronic) but EOMI   [de-identified] : +stoma with patch anterior base of neck (from prior trach site) [de-identified] : sitting in wheelchair, braces on legs; 5/5 strength UE, some spasm LLE on strength testing LE b/l but approximately 4+ strength LE;  [de-identified] : tearful at times but mostly nl affect

## 2024-09-09 ENCOUNTER — APPOINTMENT (OUTPATIENT)
Dept: ENDOCRINOLOGY | Facility: CLINIC | Age: 44
End: 2024-09-09
Payer: COMMERCIAL

## 2024-09-09 VITALS
DIASTOLIC BLOOD PRESSURE: 71 MMHG | HEART RATE: 72 BPM | BODY MASS INDEX: 21.26 KG/M2 | OXYGEN SATURATION: 100 % | WEIGHT: 120 LBS | HEIGHT: 63 IN | SYSTOLIC BLOOD PRESSURE: 108 MMHG

## 2024-09-09 DIAGNOSIS — M81.0 AGE-RELATED OSTEOPOROSIS W/OUT CURRENT PATHOLOGICAL FRACTURE: ICD-10-CM

## 2024-09-09 DIAGNOSIS — N91.2 AMENORRHEA, UNSPECIFIED: ICD-10-CM

## 2024-09-09 PROCEDURE — 99214 OFFICE O/P EST MOD 30 MIN: CPT

## 2024-09-09 NOTE — PHYSICAL EXAM
[Alert] : alert [No Acute Distress] : no acute distress [EOMI] : extra ocular movement intact [Normal Hearing] : hearing was normal [Supple] : the neck was supple [No Respiratory Distress] : no respiratory distress [No Accessory Muscle Use] : no accessory muscle use [Normal Rate and Effort] : normal respiratory rate and effort [Normal Rate] : heart rate was normal [Regular Rhythm] : with a regular rhythm [No Edema] : no peripheral edema [Not Tender] : non-tender [Not Distended] : not distended [Soft] : abdomen soft [No Spinal Tenderness] : no spinal tenderness [Spine Straight] : spine straight [No Stigmata of Cushings Syndrome] : no stigmata of Cushings Syndrome [Oriented x3] : oriented to person, place, and time [Normal Affect] : the affect was normal [Normal Insight/Judgement] : insight and judgment were intact [Normal Mood] : the mood was normal [Kyphosis] : no kyphosis present [Scoliosis] : no scoliosis [de-identified] : +healed trach site [de-identified] : b/l weakness in wheelchair

## 2024-09-09 NOTE — HISTORY OF PRESENT ILLNESS
[FreeTextEntry1] : 45 yo female referred initially for secondary adrenal insufficiency evaluation 8/6/2015. 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, initially complicated by neurocardiogenic shock (EF 10% per cardiology, similar to Takotsubo's cardiomyopathy) requiring balloon pump and pressors, was stabilized at [facility] for 1-2 weeks (had D&C for loss of pregnancy), EF improved, transferred to [place] for neuro care, had embolization x 2 and craniotomy with resection of large AVM and  shunt, was in neuro ICU for a month with trach and peg, transferred to [facility] for rehab for 5 weeks, then had seizure, found that glue they used during neurosurgery for embolization was causing inflammation of brain so was admitted back to [facility] in early November for repeat neurosurgery to clean out glue, was back in neuro ICU for another month, then went back to [place] rehab for another 6 weeks, then d/c'd to home from [place] 12/2014. Since her ICH Decadron had been started for cerebral edema. On doses as high as 10 mg q6. Her  had been slowly titrating her steroid doses down. Seen initially by me 8/2015. Recommended slow taper over the course of 1-2 months of prednisone, which she has been off now since 2015. No symptoms or signs of adrenal insufficiency. Repeat am cortisol off steroids appropriate 8/2015.   She was also found to have osteoporosis with DXA 8/3/15 revealing a T-score of -3.5 in the spine, -2.5 in the total hip[, and -2.3 in the femoral neck. She has not had any known fractures. She was mostly immobile, but trying to do some exercises with legs and some attempts at walking.  admits to her being a high fall risk. He also reports she has had amenorrhea. Recommended Alendronate weekly which she started taking in the liquid form started 3/2016. She had some bloating and gas but denies any gastritis or reflux symptoms. Discussed at length with her  who is a physician. 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 We discussed the benefits of a low dose estrogen/progesterone OCP to help maintain any improvement in bone disease we made with the past 5 years of Fosamax. Repeat DEXA  7/2022 (1 year after starting OCP) showed stability in the spine and improvement in the hip. Repeat DEXA 7/2023 with decline in spine and hip. Recommended anabolic agent. Now on Tymlos since 9/2023. Had tooth extracted 1-2 months prior to starting Fosamax. Has been more mobile since last visit now walking with a walker at home. Has PT 3 days per week. No recent falls or fractures. No back or hip pain. On OCP patient has had breakthrough bleeding about two times per year.  Repeat DEXA 7/28/2023 shows: VS DEXA 7/15/23 L1-4 T score -3.4, with statistically significant worsening of BMD and consistent with osteoporosis Femoral Neck -2.8, c/w osteoporosis with statistically worsening BMD Total Hip -2.8 c/w osteoporosis with statistically significant worsening in BMD  Now on Tymlos daily since 9/2023. Tolerating well. No interval fractures. Significant improvement in osteoporosis.

## 2024-09-09 NOTE — ASSESSMENT
[FreeTextEntry1] : 89y/o F w/ a PMH of hypogonadotropic hypogonadism, osteoporosis, complex AVM rupture and intracranial hemorrhage resulting in being wheelchair-bound who presents for follow up of osteoporosis. Recent DEXA scan 7/28/23 showed statistically significant worsening of BMD in hip and spine started on Tymlos 9/2024 now with significant improvement.   1. Osteoporosis most- Discussed treatment options at length with the . Pt. at high risk of fracture and warrants treatment. s/p Fosamax. DXA performed 6/2017 with improvement in spine, but worse in the hip and femoral neck. Prior images unable to be viewed so difficulty fully assessing if there is a significant difference. Most likely cause is steroids possibly combined with amenorrhea. Estrogen may theoretically be a reasonable option for treatment especially since her estradiol was low. Discussed at length with her  who is a physician. 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 We discussed the benefits of a low dose estrogen/progesterone OCP to help maintain any improvement in bone disease we made with 5 years of Fosamax. Repeat DXA 7/2023 with decrease in the spine hip on 1 year of OCP s/p Fosamax. OCP being managed by GYN Dr. Janna Ponce.  -Now on Tymlos sine 9/2023. Repeat DEXA 9/2024 s/p 1 year of Tymlos significantly improvemed. Recommend 1 more year of Tymlos then transition to bisphosphonate or Prolia.   2. Amenorrhea- recommended OCP. Now with some breakthrough bleeding at least once every 6 months. GYN follow-up.    Prep time with review of labs and interval progress notes and consultations  Discussion with patient regarding osteoporosis and low estrogen state with management plan, risks and benefits of treatment options and goals of care answering all patients questions and addressing all concerns  Review of recent DEXA results with patient and  at length Post-visit completion charting and review Total Time 30 min  Specifically causes, evaluation, treatment options, risks, complications, and benefits of available therapies were discussed. Questions were answered.  The submitted E/M billing level for this visit reflects the total time spent on the day of the visit including face-to-face time spent with the patient, non-face-to-face review of medical records and relevant information, documentation, and asynchronous communication with the patient after a visit via phone, email, or patient's EHR portal after the visit.  The medical records reviewed are either scanned into the chart or reviewed with the patient using a patient's electronic medical records portal for patients with records not available to [facility] via electronic transmission platforms from other institutions and labs.  Time spend counseling and performing coordination of care was also included in determining the appropriate EM billing level.  I have reviewed and verified information regarding the chief complaint and history recorded by the ancillary staff and/or the patient. I have independently reviewed and interpreted tests performed by other physicians and facilities as necessary.   I have discussed with the patient differential diagnosis, reason for auxiliary tests if ordered, risks, benefits, alternatives, and complications of each form of therapy were discussed.

## 2024-09-16 DIAGNOSIS — I62.9 NONTRAUMATIC INTRACRANIAL HEMORRHAGE, UNSPECIFIED: ICD-10-CM

## 2024-09-16 DIAGNOSIS — Z00.00 ENCOUNTER FOR GENERAL ADULT MEDICAL EXAMINATION WITHOUT ABNORMAL FINDINGS: ICD-10-CM

## 2024-10-15 ENCOUNTER — TRANSCRIPTION ENCOUNTER (OUTPATIENT)
Age: 44
End: 2024-10-15

## 2025-06-05 ENCOUNTER — RX RENEWAL (OUTPATIENT)
Age: 45
End: 2025-06-05

## 2025-07-11 ENCOUNTER — APPOINTMENT (OUTPATIENT)
Dept: OBGYN | Facility: CLINIC | Age: 45
End: 2025-07-11
Payer: COMMERCIAL

## 2025-07-11 ENCOUNTER — ASOB RESULT (OUTPATIENT)
Age: 45
End: 2025-07-11

## 2025-07-11 VITALS
BODY MASS INDEX: 18.25 KG/M2 | SYSTOLIC BLOOD PRESSURE: 96 MMHG | HEIGHT: 63 IN | WEIGHT: 103 LBS | DIASTOLIC BLOOD PRESSURE: 67 MMHG

## 2025-07-11 PROBLEM — Z13.31 DEPRESSION SCREEN: Status: ACTIVE | Noted: 2025-07-11

## 2025-07-11 PROCEDURE — 99459 PELVIC EXAMINATION: CPT

## 2025-07-11 PROCEDURE — 82270 OCCULT BLOOD FECES: CPT

## 2025-07-11 PROCEDURE — 99396 PREV VISIT EST AGE 40-64: CPT

## 2025-07-11 PROCEDURE — 76830 TRANSVAGINAL US NON-OB: CPT

## 2025-07-13 LAB — HPV HIGH+LOW RISK DNA PNL CVX: NOT DETECTED

## 2025-07-15 LAB — CYTOLOGY CVX/VAG DOC THIN PREP: NORMAL

## 2025-08-05 ENCOUNTER — NON-APPOINTMENT (OUTPATIENT)
Age: 45
End: 2025-08-05

## 2025-08-29 ENCOUNTER — APPOINTMENT (OUTPATIENT)
Dept: ULTRASOUND IMAGING | Facility: CLINIC | Age: 45
End: 2025-08-29
Payer: COMMERCIAL

## 2025-08-29 ENCOUNTER — APPOINTMENT (OUTPATIENT)
Dept: MAMMOGRAPHY | Facility: CLINIC | Age: 45
End: 2025-08-29
Payer: COMMERCIAL

## 2025-08-29 ENCOUNTER — RESULT REVIEW (OUTPATIENT)
Age: 45
End: 2025-08-29

## 2025-08-29 ENCOUNTER — OUTPATIENT (OUTPATIENT)
Dept: OUTPATIENT SERVICES | Facility: HOSPITAL | Age: 45
LOS: 1 days | End: 2025-08-29
Payer: COMMERCIAL

## 2025-08-29 DIAGNOSIS — Z00.8 ENCOUNTER FOR OTHER GENERAL EXAMINATION: ICD-10-CM

## 2025-08-29 DIAGNOSIS — Z01.419 ENCOUNTER FOR GYNECOLOGICAL EXAMINATION (GENERAL) (ROUTINE) WITHOUT ABNORMAL FINDINGS: ICD-10-CM

## 2025-08-29 PROCEDURE — 77063 BREAST TOMOSYNTHESIS BI: CPT | Mod: 26

## 2025-08-29 PROCEDURE — 77067 SCR MAMMO BI INCL CAD: CPT | Mod: 26

## 2025-08-29 PROCEDURE — 76641 ULTRASOUND BREAST COMPLETE: CPT

## 2025-08-29 PROCEDURE — 76641 ULTRASOUND BREAST COMPLETE: CPT | Mod: 26,50

## 2025-08-29 PROCEDURE — 77067 SCR MAMMO BI INCL CAD: CPT

## 2025-08-29 PROCEDURE — 77063 BREAST TOMOSYNTHESIS BI: CPT

## 2025-09-10 ENCOUNTER — NON-APPOINTMENT (OUTPATIENT)
Age: 45
End: 2025-09-10

## 2025-09-12 ENCOUNTER — APPOINTMENT (OUTPATIENT)
Dept: ENDOCRINOLOGY | Facility: CLINIC | Age: 45
End: 2025-09-12

## 2025-09-12 ENCOUNTER — APPOINTMENT (OUTPATIENT)
Dept: INTERNAL MEDICINE | Facility: CLINIC | Age: 45
End: 2025-09-12

## 2025-09-12 VITALS — WEIGHT: 105 LBS | BODY MASS INDEX: 18.61 KG/M2 | HEIGHT: 63 IN

## 2025-09-15 ENCOUNTER — APPOINTMENT (OUTPATIENT)
Dept: ENDOCRINOLOGY | Facility: CLINIC | Age: 45
End: 2025-09-15
Payer: COMMERCIAL

## 2025-09-15 VITALS
HEIGHT: 63 IN | DIASTOLIC BLOOD PRESSURE: 65 MMHG | OXYGEN SATURATION: 99 % | HEART RATE: 57 BPM | SYSTOLIC BLOOD PRESSURE: 92 MMHG | WEIGHT: 105 LBS | BODY MASS INDEX: 18.61 KG/M2

## 2025-09-15 DIAGNOSIS — N91.2 AMENORRHEA, UNSPECIFIED: ICD-10-CM

## 2025-09-15 PROCEDURE — 99214 OFFICE O/P EST MOD 30 MIN: CPT

## 2025-09-15 PROCEDURE — G2211 COMPLEX E/M VISIT ADD ON: CPT | Mod: NC

## 2025-09-16 LAB
BASOPHILS # BLD AUTO: 0.03 K/UL
BASOPHILS NFR BLD AUTO: 0.5 %
EOSINOPHIL # BLD AUTO: 0.03 K/UL
EOSINOPHIL NFR BLD AUTO: 0.5 %
ESTIMATED AVERAGE GLUCOSE: 97 MG/DL
HBA1C MFR BLD HPLC: 5 %
HCT VFR BLD CALC: 42.9 %
HGB BLD-MCNC: 13.3 G/DL
IMM GRANULOCYTES NFR BLD AUTO: 0.2 %
LYMPHOCYTES # BLD AUTO: 1.03 K/UL
LYMPHOCYTES NFR BLD AUTO: 16.2 %
MAN DIFF?: NORMAL
MCHC RBC-ENTMCNC: 31 G/DL
MCHC RBC-ENTMCNC: 31.5 PG
MCV RBC AUTO: 101.7 FL
MONOCYTES # BLD AUTO: 0.37 K/UL
MONOCYTES NFR BLD AUTO: 5.8 %
NEUTROPHILS # BLD AUTO: 4.87 K/UL
NEUTROPHILS NFR BLD AUTO: 76.8 %
PLATELET # BLD AUTO: 262 K/UL
RBC # BLD: 4.22 M/UL
RBC # FLD: 12.4 %
WBC # FLD AUTO: 6.34 K/UL

## 2025-09-17 LAB
24R-OH-CALCIDIOL SERPL-MCNC: 108 PG/ML
25(OH)D3 SERPL-MCNC: 49.2 NG/ML
ALBUMIN SERPL ELPH-MCNC: 3.9 G/DL
ALP BLD-CCNC: 63 U/L
ALT SERPL-CCNC: 14 U/L
ANION GAP SERPL CALC-SCNC: 12 MMOL/L
AST SERPL-CCNC: 16 U/L
BILIRUB SERPL-MCNC: 0.2 MG/DL
BUN SERPL-MCNC: 16 MG/DL
CALCIUM SERPL-MCNC: 8.9 MG/DL
CALCIUM SERPL-MCNC: 8.9 MG/DL
CHLORIDE SERPL-SCNC: 106 MMOL/L
CHOLEST SERPL-MCNC: 175 MG/DL
CO2 SERPL-SCNC: 23 MMOL/L
CREAT SERPL-MCNC: 0.8 MG/DL
EGFRCR SERPLBLD CKD-EPI 2021: 92 ML/MIN/1.73M2
GLUCOSE SERPL-MCNC: 96 MG/DL
HDLC SERPL-MCNC: 57 MG/DL
LDLC SERPL-MCNC: 84 MG/DL
NONHDLC SERPL-MCNC: 118 MG/DL
PARATHYROID HORMONE INTACT: 16 PG/ML
POTASSIUM SERPL-SCNC: 4.3 MMOL/L
PROT SERPL-MCNC: 5.9 G/DL
SODIUM SERPL-SCNC: 141 MMOL/L
T4 FREE SERPL-MCNC: 1.3 NG/DL
TRIGL SERPL-MCNC: 204 MG/DL
TSH SERPL-ACNC: 1.02 UIU/ML

## 2025-09-26 PROBLEM — R71.8 ELEVATED MCV: Status: ACTIVE | Noted: 2025-09-26
